# Patient Record
Sex: FEMALE | Race: WHITE | NOT HISPANIC OR LATINO | Employment: OTHER | URBAN - METROPOLITAN AREA
[De-identification: names, ages, dates, MRNs, and addresses within clinical notes are randomized per-mention and may not be internally consistent; named-entity substitution may affect disease eponyms.]

---

## 2017-02-23 RX ORDER — VALACYCLOVIR HYDROCHLORIDE 1 G/1
TABLET, FILM COATED ORAL
Qty: 30 TABLET | Refills: 3 | Status: SHIPPED | OUTPATIENT
Start: 2017-02-23

## 2017-05-12 ENCOUNTER — HOSPITAL ENCOUNTER (OUTPATIENT)
Dept: RADIOLOGY | Facility: HOSPITAL | Age: 52
Discharge: HOME | End: 2017-05-12
Attending: FAMILY MEDICINE
Payer: COMMERCIAL

## 2017-05-12 DIAGNOSIS — E34.9 UNSPECIFIED ENDOCRINE DISORDER: ICD-10-CM

## 2017-05-12 DIAGNOSIS — Z12.31 VISIT FOR SCREENING MAMMOGRAM: ICD-10-CM

## 2017-05-12 PROCEDURE — 77080 DXA BONE DENSITY AXIAL: CPT | Mod: TC,PO

## 2017-05-12 PROCEDURE — 77067 SCR MAMMO BI INCL CAD: CPT | Mod: TC

## 2017-05-12 PROCEDURE — 77067 SCR MAMMO BI INCL CAD: CPT | Mod: 26,,, | Performed by: RADIOLOGY

## 2017-05-12 PROCEDURE — 77063 BREAST TOMOSYNTHESIS BI: CPT | Mod: 26,,, | Performed by: RADIOLOGY

## 2017-05-12 PROCEDURE — 77080 DXA BONE DENSITY AXIAL: CPT | Mod: 26,,, | Performed by: RADIOLOGY

## 2017-05-19 ENCOUNTER — HOSPITAL ENCOUNTER (OUTPATIENT)
Dept: RADIOLOGY | Facility: HOSPITAL | Age: 52
Discharge: HOME | End: 2017-05-19
Attending: OBSTETRICS & GYNECOLOGY
Payer: COMMERCIAL

## 2017-05-19 DIAGNOSIS — R92.8 ABNORMAL MAMMOGRAM OF LEFT BREAST: ICD-10-CM

## 2017-05-19 PROCEDURE — 77061 BREAST TOMOSYNTHESIS UNI: CPT | Mod: TC,LT

## 2017-05-19 PROCEDURE — 77065 DX MAMMO INCL CAD UNI: CPT | Mod: 26,LT,, | Performed by: RADIOLOGY

## 2017-05-19 PROCEDURE — 77065 DX MAMMO INCL CAD UNI: CPT | Mod: TC,LT

## 2017-05-19 PROCEDURE — 77061 BREAST TOMOSYNTHESIS UNI: CPT | Mod: 26,LT,, | Performed by: RADIOLOGY

## 2017-05-19 PROCEDURE — 76642 ULTRASOUND BREAST LIMITED: CPT | Mod: TC,PO,LT

## 2017-05-19 PROCEDURE — 76642 ULTRASOUND BREAST LIMITED: CPT | Mod: 26,LT,, | Performed by: RADIOLOGY

## 2017-05-22 ENCOUNTER — OFFICE VISIT (OUTPATIENT)
Dept: OTOLARYNGOLOGY | Facility: CLINIC | Age: 52
End: 2017-05-22
Payer: COMMERCIAL

## 2017-05-22 ENCOUNTER — TELEPHONE (OUTPATIENT)
Dept: SURGERY | Facility: HOSPITAL | Age: 52
End: 2017-05-22

## 2017-05-22 ENCOUNTER — CLINICAL SUPPORT (OUTPATIENT)
Dept: AUDIOLOGY | Facility: CLINIC | Age: 52
End: 2017-05-22
Payer: COMMERCIAL

## 2017-05-22 ENCOUNTER — LAB VISIT (OUTPATIENT)
Dept: LAB | Facility: HOSPITAL | Age: 52
End: 2017-05-22
Attending: UROLOGY
Payer: COMMERCIAL

## 2017-05-22 VITALS
SYSTOLIC BLOOD PRESSURE: 141 MMHG | WEIGHT: 154.75 LBS | DIASTOLIC BLOOD PRESSURE: 71 MMHG | BODY MASS INDEX: 24.87 KG/M2 | HEART RATE: 62 BPM | HEIGHT: 66 IN

## 2017-05-22 DIAGNOSIS — J30.9 ALLERGIC RHINITIS, UNSPECIFIED ALLERGIC RHINITIS TRIGGER, UNSPECIFIED RHINITIS SEASONALITY: Primary | ICD-10-CM

## 2017-05-22 DIAGNOSIS — H92.01 EAR PAIN, RIGHT: Primary | ICD-10-CM

## 2017-05-22 DIAGNOSIS — L29.9 ITCHING OF EAR: ICD-10-CM

## 2017-05-22 DIAGNOSIS — R07.0 THROAT DISCOMFORT: ICD-10-CM

## 2017-05-22 DIAGNOSIS — N30.00 ACUTE CYSTITIS WITHOUT HEMATURIA: ICD-10-CM

## 2017-05-22 DIAGNOSIS — N30.00 ACUTE CYSTITIS WITHOUT HEMATURIA: Primary | ICD-10-CM

## 2017-05-22 DIAGNOSIS — H92.02 OTALGIA OF LEFT EAR: Primary | ICD-10-CM

## 2017-05-22 LAB
BACTERIA #/AREA URNS HPF: ABNORMAL /HPF
BILIRUB UR QL STRIP: NEGATIVE
CLARITY UR: CLEAR
COLOR UR: YELLOW
GLUCOSE UR QL STRIP: NEGATIVE
HGB UR QL STRIP: ABNORMAL
KETONES UR QL STRIP: NEGATIVE
LEUKOCYTE ESTERASE UR QL STRIP: ABNORMAL
MICROSCOPIC COMMENT: ABNORMAL
NITRITE UR QL STRIP: NEGATIVE
PH UR STRIP: 7 [PH] (ref 5–8)
PROT UR QL STRIP: NEGATIVE
RBC #/AREA URNS HPF: 5 /HPF (ref 0–4)
SP GR UR STRIP: 1.01 (ref 1–1.03)
SQUAMOUS #/AREA URNS HPF: 1 /HPF
URN SPEC COLLECT METH UR: ABNORMAL
WBC #/AREA URNS HPF: 12 /HPF (ref 0–5)

## 2017-05-22 PROCEDURE — 87088 URINE BACTERIA CULTURE: CPT

## 2017-05-22 PROCEDURE — 3077F SYST BP >= 140 MM HG: CPT | Mod: S$GLB,,, | Performed by: NURSE PRACTITIONER

## 2017-05-22 PROCEDURE — 81000 URINALYSIS NONAUTO W/SCOPE: CPT | Mod: PO

## 2017-05-22 PROCEDURE — 3078F DIAST BP <80 MM HG: CPT | Mod: S$GLB,,, | Performed by: NURSE PRACTITIONER

## 2017-05-22 PROCEDURE — 87077 CULTURE AEROBIC IDENTIFY: CPT

## 2017-05-22 PROCEDURE — 99999 PR PBB SHADOW E&M-EST. PATIENT-LVL I: CPT | Mod: PBBFAC,,,

## 2017-05-22 PROCEDURE — 92567 TYMPANOMETRY: CPT | Mod: S$GLB,,, | Performed by: AUDIOLOGIST

## 2017-05-22 PROCEDURE — 1160F RVW MEDS BY RX/DR IN RCRD: CPT | Mod: S$GLB,,, | Performed by: NURSE PRACTITIONER

## 2017-05-22 PROCEDURE — 87186 SC STD MICRODIL/AGAR DIL: CPT

## 2017-05-22 PROCEDURE — 87086 URINE CULTURE/COLONY COUNT: CPT

## 2017-05-22 PROCEDURE — 99999 PR PBB SHADOW E&M-EST. PATIENT-LVL IV: CPT | Mod: PBBFAC,,, | Performed by: NURSE PRACTITIONER

## 2017-05-22 PROCEDURE — 99213 OFFICE O/P EST LOW 20 MIN: CPT | Mod: S$GLB,,, | Performed by: NURSE PRACTITIONER

## 2017-05-22 RX ORDER — CIPROFLOXACIN 500 MG/1
500 TABLET ORAL 2 TIMES DAILY
Qty: 20 TABLET | Refills: 0 | Status: SHIPPED | OUTPATIENT
Start: 2017-05-22 | End: 2017-06-01

## 2017-05-22 RX ORDER — FLUTICASONE PROPIONATE 50 MCG
1 SPRAY, SUSPENSION (ML) NASAL 2 TIMES DAILY
Qty: 16 G | Refills: 12 | Status: SHIPPED | OUTPATIENT
Start: 2017-05-22 | End: 2017-05-25

## 2017-05-22 NOTE — PATIENT INSTRUCTIONS
A bacterial infection of the throat is usually associated with pus, marked redness, swollen lymph nodes, and fever > 100.4.  Please take you temperature if you believe you are running fever. Please be sure to see your PCP or [facility] for throat swab if you begin to develop those symptoms.     The large majority of acute sore throats are viral. There is nothing to do but treat the symptoms.     Left-sided throat and ear symptoms in a left-sided sleeper may also be reflux-related. So try to elevate your head of bed.

## 2017-05-22 NOTE — PROGRESS NOTES
Tympanometry completed per order from Dayana Levi.     Type A (normal) tympanograms obtained bilaterally.

## 2017-05-22 NOTE — PROGRESS NOTES
Subjective:       Patient ID: Linda He is a 52 y.o. female.    Chief Complaint: Ear Fullness and Sore Throat    Ear Fullness    Associated symptoms include a sore throat (left side irritated). Pertinent negatives include no coughing, rash or rhinorrhea.   Sore Throat    Associated symptoms include a plugged ear sensation. Pertinent negatives include no coughing, ear pain or shortness of breath.      Patient states symptoms started less than 24 hours ago. She reports mild left-sided throat discomfort and a deep itchy sensation in her left ear. She suspects low-grade fever. She denies signs and symptoms of acute bacterial rhinosinusitis.     Review of Systems   Constitutional: Fever: subjective low-grade.   HENT: Positive for sore throat (left side irritated). Negative for ear pain and rhinorrhea.         Left ear with deep itch   Eyes: Negative.    Respiratory: Negative.  Negative for cough, shortness of breath and wheezing.    Cardiovascular: Negative.    Gastrointestinal: Negative.    Musculoskeletal: Negative.  Negative for gait problem.   Skin: Negative.  Negative for color change, pallor and rash.   Neurological: Negative.    Hematological: Negative.  Negative for adenopathy.   Psychiatric/Behavioral: Negative.  Negative for agitation.       Objective:      Physical Exam   Constitutional: She is oriented to person, place, and time. Vital signs are normal. She appears well-developed and well-nourished. She is cooperative. She does not appear ill. No distress.   HENT:   Head: Normocephalic and atraumatic.   Right Ear: Hearing, tympanic membrane, external ear and ear canal normal. Tympanic membrane is not erythematous. No middle ear effusion.   Left Ear: Hearing, tympanic membrane, external ear and ear canal normal. Tympanic membrane is not erythematous.  No middle ear effusion.   Nose: Nose normal. No mucosal edema, rhinorrhea or septal deviation. Right sinus exhibits no maxillary sinus tenderness and no  frontal sinus tenderness. Left sinus exhibits no maxillary sinus tenderness and no frontal sinus tenderness.   Mouth/Throat: Uvula is midline, oropharynx is clear and moist and mucous membranes are normal. Mucous membranes are not pale, not dry and not cyanotic. No oral lesions. No oropharyngeal exudate, posterior oropharyngeal edema or posterior oropharyngeal erythema.   Eyes: Conjunctivae, EOM and lids are normal. Pupils are equal, round, and reactive to light. Right eye exhibits no discharge. Left eye exhibits no discharge. No scleral icterus.   Neck: Trachea normal and normal range of motion. Neck supple. No tracheal deviation present. No thyroid mass and no thyromegaly present.   Cardiovascular: Normal rate.    Pulmonary/Chest: Effort normal. No stridor. No respiratory distress. She has no wheezes.   Musculoskeletal: Normal range of motion.   Lymphadenopathy:        Head (right side): No submental, no submandibular, no tonsillar, no preauricular and no posterior auricular adenopathy present.        Head (left side): No submental, no submandibular, no tonsillar, no preauricular and no posterior auricular adenopathy present.     She has no cervical adenopathy.        Right cervical: No superficial cervical and no posterior cervical adenopathy present.       Left cervical: No superficial cervical and no posterior cervical adenopathy present.   Neurological: She is alert and oriented to person, place, and time. She has normal strength. Coordination and gait normal.   Skin: Skin is warm, dry and intact. No lesion and no rash noted. She is not diaphoretic. No cyanosis. No pallor.   Psychiatric: She has a normal mood and affect. Her speech is normal and behavior is normal. Judgment and thought content normal. Cognition and memory are normal.   Nursing note and vitals reviewed.      Assessment:     Negative ENT exam      Plan:    ENT exam was all clear without evidence of infection.   Baby oil to ear prn  Flonase   Her  PCP is Dr. Mora; she is encouraged to see him if symptoms persist.

## 2017-05-24 ENCOUNTER — TELEPHONE (OUTPATIENT)
Dept: UROLOGY | Facility: CLINIC | Age: 52
End: 2017-05-24

## 2017-05-24 LAB — BACTERIA UR CULT: NORMAL

## 2017-05-24 NOTE — TELEPHONE ENCOUNTER
----- Message from Rajni Anderson sent at 5/24/2017 11:27 AM CDT -----  Contact: self  Patient called stating she is on an antibiotic for an UTI    It is not helping   She is still burning with back pain    Please call her at  to advise is something can be called in      Thanks

## 2017-05-25 ENCOUNTER — OFFICE VISIT (OUTPATIENT)
Dept: UROLOGY | Facility: CLINIC | Age: 52
End: 2017-05-25
Payer: COMMERCIAL

## 2017-05-25 ENCOUNTER — TELEPHONE (OUTPATIENT)
Dept: UROLOGY | Facility: CLINIC | Age: 52
End: 2017-05-25

## 2017-05-25 VITALS
BODY MASS INDEX: 24.63 KG/M2 | DIASTOLIC BLOOD PRESSURE: 68 MMHG | SYSTOLIC BLOOD PRESSURE: 118 MMHG | WEIGHT: 153.25 LBS | HEIGHT: 66 IN | HEART RATE: 59 BPM

## 2017-05-25 DIAGNOSIS — N35.12 POSTINFECTIVE URETHRAL STRICTURE IN FEMALE: ICD-10-CM

## 2017-05-25 DIAGNOSIS — N30.00 ACUTE CYSTITIS WITHOUT HEMATURIA: Primary | ICD-10-CM

## 2017-05-25 PROCEDURE — 53660 DILATION OF URETHRA: CPT | Mod: S$GLB,,, | Performed by: UROLOGY

## 2017-05-25 PROCEDURE — 99499 UNLISTED E&M SERVICE: CPT | Mod: S$GLB,,, | Performed by: UROLOGY

## 2017-05-25 PROCEDURE — 99999 PR PBB SHADOW E&M-EST. PATIENT-LVL III: CPT | Mod: PBBFAC,,, | Performed by: UROLOGY

## 2017-05-25 RX ORDER — OLMESARTAN MEDOXOMIL 20 MG/1
20 TABLET ORAL DAILY
COMMUNITY
End: 2017-11-07

## 2017-05-25 NOTE — PROGRESS NOTES
UROLOGY [place]  5 25 17    c-c nocturia, back pain    Age 52, comes in with some low back pain bilaterally. It is worse with certain body movements.  She had a uti lately with pansensitive e coli and was put on cipro for 10 days. She is feeling better.  She says in the past her urethral has tended to close up and dilatation of it has helped keep uti's away.    No fever now. Has nocturia x 2-3, some urinary frequency, no urgency or incontinence.   Pt requests urethral dilatation    FEMALE URETHRAL DILATATION PROCEDURE  Prop dx: stenosis of urethral  Postop dianosis: same   Procedure Female urethral dilatation  The genital area was prepped   We lubricated the urethra and the darrel sounds   We started with the 18F sound and continued with progressive sizes in even numbers.  There was some resistance as the dilators were pushed it, minimal discomfort.   The last size used was 28F  Pt tolerated well  There was no obvious bleeding.    Pt was then allowed to go she was in satisfactory condition.  Finish the cipro antibiotic as instructed  Pain medication as needed.  Her pain is likely musculoskeletal  RTC as needed.

## 2017-05-25 NOTE — TELEPHONE ENCOUNTER
----- Message from Mickey Martínez MD sent at 5/25/2017 12:49 PM CDT -----  Positive urine culture, sensitive to Cipro

## 2017-05-25 NOTE — TELEPHONE ENCOUNTER
SPoke to pt and booked her an apt for today. Pt does have uti, but is not feeling any better and is experiencing flank pain.

## 2017-05-25 NOTE — TELEPHONE ENCOUNTER
----- Message from Kiko Perez sent at 5/25/2017  8:21 AM CDT -----  Pt called stated that she's waiting on a call back from the nurse/stated that she's on her 4th antiobiotic and its not helping the bladder infection/pls call back at [phone]

## 2017-08-23 ENCOUNTER — TELEPHONE (OUTPATIENT)
Dept: UROLOGY | Facility: CLINIC | Age: 52
End: 2017-08-23

## 2017-08-23 ENCOUNTER — LAB VISIT (OUTPATIENT)
Dept: LAB | Facility: HOSPITAL | Age: 52
End: 2017-08-23
Attending: UROLOGY
Payer: COMMERCIAL

## 2017-08-23 DIAGNOSIS — N30.00 ACUTE CYSTITIS WITHOUT HEMATURIA: Primary | ICD-10-CM

## 2017-08-23 DIAGNOSIS — N30.00 ACUTE CYSTITIS WITHOUT HEMATURIA: ICD-10-CM

## 2017-08-23 LAB
BACTERIA #/AREA URNS HPF: ABNORMAL /HPF
BILIRUB UR QL STRIP: NEGATIVE
CLARITY UR: CLEAR
COLOR UR: YELLOW
GLUCOSE UR QL STRIP: NEGATIVE
HGB UR QL STRIP: ABNORMAL
KETONES UR QL STRIP: NEGATIVE
LEUKOCYTE ESTERASE UR QL STRIP: NEGATIVE
MICROSCOPIC COMMENT: ABNORMAL
NITRITE UR QL STRIP: NEGATIVE
PH UR STRIP: 7 [PH] (ref 5–8)
PROT UR QL STRIP: NEGATIVE
RBC #/AREA URNS HPF: 8 /HPF (ref 0–4)
SP GR UR STRIP: 1.01 (ref 1–1.03)
SQUAMOUS #/AREA URNS HPF: 4 /HPF
URN SPEC COLLECT METH UR: ABNORMAL
WBC #/AREA URNS HPF: 3 /HPF (ref 0–5)

## 2017-08-23 PROCEDURE — 81000 URINALYSIS NONAUTO W/SCOPE: CPT | Mod: PO

## 2017-08-23 PROCEDURE — 87086 URINE CULTURE/COLONY COUNT: CPT

## 2017-08-23 NOTE — TELEPHONE ENCOUNTER
----- Message from Cecilia Martinez sent at 8/23/2017  8:16 AM CDT -----  Contact: self   Patient want to know if you can send orders for urine test, please call patient back after you put orders in [phone] (home)

## 2017-08-24 LAB
BACTERIA UR CULT: NORMAL
BACTERIA UR CULT: NORMAL

## 2017-08-25 ENCOUNTER — TELEPHONE (OUTPATIENT)
Dept: UROLOGY | Facility: CLINIC | Age: 52
End: 2017-08-25

## 2017-08-25 NOTE — TELEPHONE ENCOUNTER
----- Message from Jovan Escobar sent at 8/25/2017  1:10 PM CDT -----  Contact: same  Patient called in and was checking the status of her urine test that was done on 8/23.  Patient call back number is [phone]

## 2017-11-07 ENCOUNTER — OFFICE VISIT (OUTPATIENT)
Dept: OBSTETRICS AND GYNECOLOGY | Facility: CLINIC | Age: 52
End: 2017-11-07
Payer: COMMERCIAL

## 2017-11-07 VITALS — HEIGHT: 67 IN | WEIGHT: 153.69 LBS | BODY MASS INDEX: 24.12 KG/M2

## 2017-11-07 DIAGNOSIS — N95.2 ATROPHIC VAGINITIS: Primary | ICD-10-CM

## 2017-11-07 PROCEDURE — 99203 OFFICE O/P NEW LOW 30 MIN: CPT | Mod: S$GLB,,, | Performed by: SPECIALIST

## 2017-11-07 PROCEDURE — 99999 PR PBB SHADOW E&M-EST. PATIENT-LVL III: CPT | Mod: PBBFAC,,, | Performed by: SPECIALIST

## 2017-11-07 RX ORDER — ESTRADIOL 0.1 MG/G
1 CREAM VAGINAL DAILY
Qty: 30 G | Refills: 11 | Status: SHIPPED | OUTPATIENT
Start: 2017-11-07 | End: 2019-05-28 | Stop reason: SDUPTHER

## 2017-11-07 RX ORDER — SPIRONOLACTONE 50 MG/1
50 TABLET, FILM COATED ORAL EVERY MORNING
COMMUNITY

## 2017-11-07 NOTE — PROGRESS NOTES
51 yo WF, h/o hyst and on ERT for approx 3 years presents for evaluation complaint vaginal vulvar irriation and dryness which was exacerbated over last several weeks. Pt denies visible lesion, discharge, vaginal bleeding, n/v. Is followed by Urology for chronic cystitis.  Past Medical History:   Diagnosis Date    Abnormal Pap smear     Abnormal Pap smear of cervix     Allergy     Hormone disorder     Hypertension     Kidney stone     all passed spontaneously (approx x 2)    Urinary tract infection     Vaginal infection        Past Surgical History:   Procedure Laterality Date    APPENDECTOMY       surgery       SECTION      CYSTOSCOPY      HYSTERECTOMY      OOPHORECTOMY         Family History   Problem Relation Age of Onset    Hypertension Mother     Hypertension Father     Nephrolithiasis Brother     Breast cancer Paternal Grandmother       of    Diabetes Neg Hx     Ovarian cancer Neg Hx        Social History     Social History    Marital status:      Spouse name: N/A    Number of children: N/A    Years of education: N/A     Occupational History    draws house plans, 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      magnesium citrate 100 mg Tab Take by mouth once daily.      spironolactone (ALDACTONE) 50 MG tablet Take 50 mg by mouth once daily.      vitamin D 1000 units Tab Take 185 mg by mouth once daily.      estradiol (ESTRACE) 0.01 % (0.1 mg/gram) vaginal cream Place 1 g vaginally once daily. 30 g 11    valacyclovir (VALTREX) 1000 MG tablet TAKE 2 PILLS AT THE FIRST SIGN OF  A COLD SORE, TAKE AN ADDITIONAL 2 PILLS 12 HOURS LATER. 30 tablet 3     No current facility-administered medications for this visit.        Review of patient's allergies indicates:   Allergen Reactions    Latex Hives       PE:   APPEARANCE: Well nourished, well developed, in no acute distress.  NECK: Neck symmetric without masses or thyromegaly.  NODES: No inguinal lymph node enlargement.  ABDOMEN: Soft. No tenderness or masses. No hepatosplenomegaly. No hernias.  BREASTS: Symmetrical, no skin changes or visible lesions. No palpable masses, nipple discharge or adenopathy bilaterally.  PELVIC: Normal external female genitalia without lesions. Normal hair distribution. Adequate perineal body, normal urethral meatus. Vagina atrophic and poorly rugated without lesions or discharge. No significant cystocele or rectocele. Uterus and cervix surgically absent. Bimanual exam revealed no masses, tenderness or abnormality.    I discussed ATV and recommend daily application estrogen cream. I explained estrogen dependent changes and over time feel will improve.  Answered all questions and pt is agreeable  mammo screening April 2018  COUNSELING:  The patient was counseled today on osteoporosis prevention, calcium supplementation, and regular weight bearing exercise. The patient was also counseled today on ACS PAP guidelines, with recommendations for yearly pelvic exams unless their uterus, cervix, and ovaries were removed for benign reasons; in that case, examinations every 3-5 years are recommended. The patient was also counseled regarding monthly breast self-examination, routine STD screening for at-risk populations, prophylactic immunizations for transmitted infections such as HPV, Pertussis, or Influenza as appropriate, and yearly mammograms when indicated by ACS guidelines. She was advised to see her primary care physician for all other health maintenance.   FOLLOW-UP with me for next routine visit.

## 2017-12-06 ENCOUNTER — TELEPHONE (OUTPATIENT)
Dept: UROLOGY | Facility: CLINIC | Age: 52
End: 2017-12-06

## 2017-12-06 NOTE — TELEPHONE ENCOUNTER
Office visit scheduled for tomorrow, 12/7/17. Flank pain, foul smelling urine, been on Levaquin for 6 days.

## 2017-12-06 NOTE — TELEPHONE ENCOUNTER
----- Message from Rosio Clifton sent at 12/6/2017  8:14 AM CST -----  Contact: socorro Palacios,  Day 6, still no relief   Call back

## 2017-12-07 ENCOUNTER — OFFICE VISIT (OUTPATIENT)
Dept: UROLOGY | Facility: CLINIC | Age: 52
End: 2017-12-07
Payer: COMMERCIAL

## 2017-12-07 VITALS
HEIGHT: 67 IN | SYSTOLIC BLOOD PRESSURE: 134 MMHG | DIASTOLIC BLOOD PRESSURE: 78 MMHG | TEMPERATURE: 98 F | HEART RATE: 70 BPM | WEIGHT: 153.44 LBS | BODY MASS INDEX: 24.08 KG/M2

## 2017-12-07 DIAGNOSIS — R31.29 HEMATURIA, MICROSCOPIC: ICD-10-CM

## 2017-12-07 DIAGNOSIS — M54.41 ACUTE BILATERAL LOW BACK PAIN WITH RIGHT-SIDED SCIATICA: Primary | ICD-10-CM

## 2017-12-07 LAB
BILIRUB SERPL-MCNC: ABNORMAL MG/DL
BLOOD URINE, POC: ABNORMAL
COLOR, POC UA: YELLOW
GLUCOSE UR QL STRIP: ABNORMAL
KETONES UR QL STRIP: ABNORMAL
LEUKOCYTE ESTERASE URINE, POC: ABNORMAL
NITRITE, POC UA: ABNORMAL
PH, POC UA: 8
PROTEIN, POC: ABNORMAL
SPECIFIC GRAVITY, POC UA: 1.01
UROBILINOGEN, POC UA: ABNORMAL

## 2017-12-07 PROCEDURE — 99999 PR PBB SHADOW E&M-EST. PATIENT-LVL III: CPT | Mod: PBBFAC,,, | Performed by: UROLOGY

## 2017-12-07 PROCEDURE — 99214 OFFICE O/P EST MOD 30 MIN: CPT | Mod: 25,S$GLB,, | Performed by: UROLOGY

## 2017-12-07 PROCEDURE — 81002 URINALYSIS NONAUTO W/O SCOPE: CPT | Mod: S$GLB,,, | Performed by: UROLOGY

## 2017-12-07 RX ORDER — LEVOFLOXACIN 750 MG/1
750 TABLET ORAL DAILY
COMMUNITY
End: 2019-04-10

## 2017-12-07 NOTE — PROGRESS NOTES
Subjective:       Patient ID: Linda He is a 52 y.o. female.    Chief Complaint: Flank Pain    HPI     52 year old with chronic cystitis.  She was travelling in [place] and noticed bad urine odor.  She had no dysuria but did note worsening urgency.  She had a course of Levaquin on hand which she began 7 days ago.  He urinary symptoms have resolved but she still complains of low back pain and headache.  Pain is across lower back.  The pain is positional.  She denies gross hematuria.  Her UA notes microhematuria which she says has been an ongoing finding since childhood.  Previously evaluated by Dr. Martínez.    Urine dipstick shows negative for nitrites, leukocytes, glucose, protein, positive for 2+blood.  Micro:  3-5 RBP/hpf    Review of Systems   Constitutional: Negative for fever.   Genitourinary: Negative for dysuria and hematuria.       Objective:      Physical Exam   Constitutional: She is oriented to person, place, and time. She appears well-developed and well-nourished.   HENT:   Head: Normocephalic.   Eyes: Conjunctivae and EOM are normal.   Neck: Normal range of motion.   Cardiovascular: Normal rate.    Pulmonary/Chest: Effort normal.   Abdominal: Soft. She exhibits no distension and no mass. There is no CVA tenderness.   Genitourinary:   Genitourinary Comments: Bladder non-tender and nondistended  No CVA tenderness   Musculoskeletal: She exhibits no edema.   Neurological: She is alert and oriented to person, place, and time.   Skin: Skin is warm and dry. No rash noted. No erythema.   Psychiatric: She has a normal mood and affect. Her behavior is normal.   Vitals reviewed.      Assessment:       1. Acute bilateral low back pain    2. Hematuria, microscopic        Plan:       Acute bilateral low back pain  -     POCT URINE DIPSTICK WITHOUT MICROSCOPE    Hematuria, microscopic      Her pain is likely musculoskeletal given her recent travels.   No evidence of infection on UA.  Rec.  Rest and NSAIDs.   Complete Levaquin.  If symptoms persist, f/u Dr Martínez in a couple of weeks.

## 2018-04-06 ENCOUNTER — OFFICE VISIT (OUTPATIENT)
Dept: OBSTETRICS AND GYNECOLOGY | Facility: CLINIC | Age: 53
End: 2018-04-06
Payer: COMMERCIAL

## 2018-04-06 VITALS
BODY MASS INDEX: 24.45 KG/M2 | DIASTOLIC BLOOD PRESSURE: 62 MMHG | SYSTOLIC BLOOD PRESSURE: 124 MMHG | WEIGHT: 152.13 LBS | HEIGHT: 66 IN

## 2018-04-06 DIAGNOSIS — Z01.419 ENCOUNTER FOR GYNECOLOGICAL EXAMINATION WITHOUT ABNORMAL FINDING: Primary | ICD-10-CM

## 2018-04-06 DIAGNOSIS — Z01.419 GYNECOLOGIC EXAM NORMAL: ICD-10-CM

## 2018-04-06 PROCEDURE — 87624 HPV HI-RISK TYP POOLED RSLT: CPT

## 2018-04-06 PROCEDURE — 88175 CYTOPATH C/V AUTO FLUID REDO: CPT

## 2018-04-06 PROCEDURE — 99396 PREV VISIT EST AGE 40-64: CPT | Mod: S$GLB,,, | Performed by: SPECIALIST

## 2018-04-06 PROCEDURE — 99999 PR PBB SHADOW E&M-EST. PATIENT-LVL IV: CPT | Mod: PBBFAC,,, | Performed by: SPECIALIST

## 2018-04-06 NOTE — PROGRESS NOTES
52 yo WF presents for annual gyn evaluation. Mammo screening up to date and reveiwd.  Past Medical History:   Diagnosis Date    Abnormal Pap smear     Abnormal Pap smear of cervix     Allergy     Hormone disorder     Hypertension     Kidney stone     all passed spontaneously (approx x 2)    Urinary tract infection     Vaginal infection        Past Surgical History:   Procedure Laterality Date    APPENDECTOMY       surgery       SECTION      CYSTOSCOPY      HYSTERECTOMY      OOPHORECTOMY         Family History   Problem Relation Age of Onset    Hypertension Mother     Hypertension Father     Nephrolithiasis Brother     Breast cancer Paternal Grandmother       of    Diabetes Neg Hx     Ovarian cancer Neg Hx        Social History     Social History    Marital status:      Spouse name: N/A    Number of children: N/A    Years of education: N/A     Occupational History    draws house plans, 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      magnesium citrate 100 mg Tab Take by mouth once daily.      spironolactone (ALDACTONE) 50 MG tablet Take 50 mg by mouth once daily.      vitamin D 1000 units Tab Take 185 mg by mouth once daily.      estradiol (ESTRACE) 0.01 % (0.1 mg/gram) vaginal cream Place 1 g vaginally once daily. 30 g 11    levoFLOXacin (LEVAQUIN) 750 MG tablet Take 750 mg by mouth once daily.      valacyclovir (VALTREX) 1000 MG tablet TAKE 2 PILLS AT THE FIRST SIGN OF A COLD SORE, TAKE AN ADDITIONAL 2 PILLS 12 HOURS LATER. 30 tablet 3     No current facility-administered  medications for this visit.        Review of patient's allergies indicates:   Allergen Reactions    Latex Hives    Macrobid [nitrofurantoin monohyd/m-cryst] Itching       Review of System:   General: no chills, fever, night sweats, weight gain or weight loss  Psychological: no depression or suicidal ideation  Breasts: no new or changing breast lumps, nipple discharge or masses.  Respiratory: no cough, shortness of breath, or wheezing  Cardiovascular: no chest pain or dyspnea on exertion  Gastrointestinal: no abdominal pain, change in bowel habits, or black or bloody stools  Genito-Urinary: no incontinence, urinary frequency/urgency or vulvar/vaginal symptoms, pelvic pain or abnormal vaginal bleeding.  Musculoskeletal: no gait disturbance or muscular weakness    PE:   APPEARANCE: Well nourished, well developed, in no acute distress.  NECK: Neck symmetric without masses or thyromegaly.  NODES: No inguinal lymph node enlargement.  ABDOMEN: Soft. No tenderness or masses. No hepatosplenomegaly. No hernias.  BREASTS: Symmetrical, no skin changes or visible lesions. No palpable masses, nipple discharge or adenopathy bilaterally.  PELVIC: Normal external female genitalia without lesions. Normal hair distribution. Adequate perineal body, normal urethral meatus. Vagina moist and well rugated without lesions or discharge. No significant cystocele or rectocele. Uterus and cervix surgically absent. Bimanual exam revealed no masses, tenderness or abnormality.      PAP submitted    COUNSELING:  The patient was counseled today on osteoporosis prevention, calcium supplementation, and regular weight bearing exercise. The patient was also counseled today on ACS PAP guidelines, with recommendations for yearly pelvic exams unless their uterus, cervix, and ovaries were removed for benign reasons; in that case, examinations every 3-5 years are recommended. The patient was also counseled regarding monthly breast self-examination, routine  STD screening for at-risk populations, prophylactic immunizations for transmitted infections such as HPV, Pertussis, or Influenza as appropriate, and yearly mammograms when indicated by ACS guidelines. She was advised to see her primary care physician for all other health maintenance.   FOLLOW-UP with me for next routine visit.

## 2018-04-13 LAB
HPV16 AG SPEC QL: NEGATIVE
HPV16+18+H RISK 12 DNA CVX-IMP: NEGATIVE
HPV18 DNA SPEC QL NAA+PROBE: NEGATIVE

## 2018-05-09 ENCOUNTER — TELEPHONE (OUTPATIENT)
Dept: OBSTETRICS AND GYNECOLOGY | Facility: CLINIC | Age: 53
End: 2018-05-09

## 2018-05-09 DIAGNOSIS — Z12.31 VISIT FOR SCREENING MAMMOGRAM: Primary | ICD-10-CM

## 2018-05-09 NOTE — TELEPHONE ENCOUNTER
----- Message from Jovan Escobar sent at 5/9/2018  2:36 PM CDT -----  Contact: same  Patient called in and stated she received notification that it was time for her annual mammo, last one done on 5/19/17 at [facility].  Patient stated it would easier for patient to do it at Dr. Harvey's office.  Patient call back number is [phone]

## 2018-05-25 ENCOUNTER — HOSPITAL ENCOUNTER (OUTPATIENT)
Dept: RADIOLOGY | Facility: HOSPITAL | Age: 53
Discharge: HOME | End: 2018-05-25
Attending: SPECIALIST
Payer: COMMERCIAL

## 2018-05-25 VITALS — WEIGHT: 152 LBS | HEIGHT: 66 IN | BODY MASS INDEX: 24.43 KG/M2

## 2018-05-25 DIAGNOSIS — Z12.31 VISIT FOR SCREENING MAMMOGRAM: ICD-10-CM

## 2018-05-25 PROCEDURE — 77067 SCR MAMMO BI INCL CAD: CPT | Mod: TC,PN

## 2018-05-25 PROCEDURE — 77063 BREAST TOMOSYNTHESIS BI: CPT | Mod: 26,,, | Performed by: RADIOLOGY

## 2018-05-25 PROCEDURE — 77067 SCR MAMMO BI INCL CAD: CPT | Mod: 26,,, | Performed by: RADIOLOGY

## 2019-03-08 ENCOUNTER — CLINICAL SUPPORT (OUTPATIENT)
Dept: CARDIOLOGY | Facility: CLINIC | Age: 54
End: 2019-03-08
Attending: INTERNAL MEDICINE
Payer: COMMERCIAL

## 2019-03-08 ENCOUNTER — OFFICE VISIT (OUTPATIENT)
Dept: CARDIOLOGY | Facility: CLINIC | Age: 54
End: 2019-03-08
Payer: COMMERCIAL

## 2019-03-08 VITALS
WEIGHT: 160.25 LBS | DIASTOLIC BLOOD PRESSURE: 84 MMHG | HEIGHT: 66 IN | BODY MASS INDEX: 25.75 KG/M2 | SYSTOLIC BLOOD PRESSURE: 163 MMHG | HEART RATE: 74 BPM

## 2019-03-08 VITALS
BODY MASS INDEX: 25.71 KG/M2 | WEIGHT: 160 LBS | DIASTOLIC BLOOD PRESSURE: 84 MMHG | HEART RATE: 65 BPM | SYSTOLIC BLOOD PRESSURE: 163 MMHG | HEIGHT: 66 IN

## 2019-03-08 DIAGNOSIS — I10 ESSENTIAL HYPERTENSION: ICD-10-CM

## 2019-03-08 DIAGNOSIS — Z01.810 PREOP CARDIOVASCULAR EXAM: ICD-10-CM

## 2019-03-08 PROCEDURE — 99204 OFFICE O/P NEW MOD 45 MIN: CPT | Mod: 25,S$GLB,, | Performed by: INTERNAL MEDICINE

## 2019-03-08 PROCEDURE — 93000 ELECTROCARDIOGRAM COMPLETE: CPT | Mod: S$GLB,,, | Performed by: INTERNAL MEDICINE

## 2019-03-08 PROCEDURE — 3079F PR MOST RECENT DIASTOLIC BLOOD PRESSURE 80-89 MM HG: ICD-10-PCS | Mod: CPTII,S$GLB,, | Performed by: INTERNAL MEDICINE

## 2019-03-08 PROCEDURE — 93306 TTE W/DOPPLER COMPLETE: CPT | Mod: S$GLB,,, | Performed by: INTERNAL MEDICINE

## 2019-03-08 PROCEDURE — 99204 PR OFFICE/OUTPT VISIT, NEW, LEVL IV, 45-59 MIN: ICD-10-PCS | Mod: 25,S$GLB,, | Performed by: INTERNAL MEDICINE

## 2019-03-08 PROCEDURE — 3079F DIAST BP 80-89 MM HG: CPT | Mod: CPTII,S$GLB,, | Performed by: INTERNAL MEDICINE

## 2019-03-08 PROCEDURE — 93000 EKG 12-LEAD: ICD-10-PCS | Mod: S$GLB,,, | Performed by: INTERNAL MEDICINE

## 2019-03-08 PROCEDURE — 99999 PR PBB SHADOW E&M-EST. PATIENT-LVL II: ICD-10-PCS | Mod: PBBFAC,,,

## 2019-03-08 PROCEDURE — 3008F PR BODY MASS INDEX (BMI) DOCUMENTED: ICD-10-PCS | Mod: CPTII,S$GLB,, | Performed by: INTERNAL MEDICINE

## 2019-03-08 PROCEDURE — 99999 PR PBB SHADOW E&M-EST. PATIENT-LVL III: ICD-10-PCS | Mod: PBBFAC,,, | Performed by: INTERNAL MEDICINE

## 2019-03-08 PROCEDURE — 3008F BODY MASS INDEX DOCD: CPT | Mod: CPTII,S$GLB,, | Performed by: INTERNAL MEDICINE

## 2019-03-08 PROCEDURE — 99999 PR PBB SHADOW E&M-EST. PATIENT-LVL II: CPT | Mod: PBBFAC,,,

## 2019-03-08 PROCEDURE — 99999 PR PBB SHADOW E&M-EST. PATIENT-LVL III: CPT | Mod: PBBFAC,,, | Performed by: INTERNAL MEDICINE

## 2019-03-08 PROCEDURE — 3077F SYST BP >= 140 MM HG: CPT | Mod: CPTII,S$GLB,, | Performed by: INTERNAL MEDICINE

## 2019-03-08 PROCEDURE — 93306 TRANSTHORACIC ECHO (TTE) COMPLETE (CUPID ONLY): ICD-10-PCS | Mod: S$GLB,,, | Performed by: INTERNAL MEDICINE

## 2019-03-08 PROCEDURE — 3077F PR MOST RECENT SYSTOLIC BLOOD PRESSURE >= 140 MM HG: ICD-10-PCS | Mod: CPTII,S$GLB,, | Performed by: INTERNAL MEDICINE

## 2019-03-08 RX ORDER — AMLODIPINE BESYLATE 5 MG/1
5 TABLET ORAL DAILY
COMMUNITY
End: 2019-03-08 | Stop reason: DRUGHIGH

## 2019-03-08 RX ORDER — AMLODIPINE BESYLATE 10 MG/1
10 TABLET ORAL DAILY
Qty: 30 TABLET | Refills: 11 | Status: SHIPPED | OUTPATIENT
Start: 2019-03-08 | End: 2023-07-27 | Stop reason: ALTCHOICE

## 2019-03-08 NOTE — PROGRESS NOTES
Subjective:    Patient ID:  Linda He is a 54 y.o. female who presents for evaluation of Hypertension (Cardiac Clearance Right Knee Scope)      Pt scheduled next week for knee arthroscopy. She had pre-op ECG which computer read as abnormal. Other than HTN she has no other medical issues. She reports no symptoms and prior to her knee problem, she exercised regularly w/o problem. She denies any chest pain, SOB, PND, orthopnea. She denies any palpitations, dizziness, syncope or pre-syncope. She denies claudication, lower extremity edema. She denies exertional symptoms.        Review of Systems   Constitution: Negative for weight gain and weight loss.   HENT: Negative.    Eyes: Negative.    Cardiovascular: Negative for chest pain, claudication, cyanosis, dyspnea on exertion, irregular heartbeat, leg swelling, near-syncope, orthopnea (no PND), palpitations and syncope.   Respiratory: Negative for cough, hemoptysis, shortness of breath and snoring.    Endocrine: Negative.    Skin: Negative.    Musculoskeletal: Positive for joint pain. Negative for muscle cramps, muscle weakness and myalgias.   Gastrointestinal: Negative for diarrhea, hematemesis, nausea and vomiting.   Genitourinary: Negative.    Neurological: Negative for dizziness, focal weakness, light-headedness, loss of balance, numbness, paresthesias and seizures.   Psychiatric/Behavioral: Negative.         Objective:    Physical Exam   Constitutional: She is oriented to person, place, and time. She appears well-developed and well-nourished.   Eyes: Pupils are equal, round, and reactive to light.   Neck: Normal range of motion. No thyromegaly present.   Cardiovascular: Normal rate, regular rhythm, S1 normal, S2 normal, normal heart sounds, intact distal pulses and normal pulses.  No extrasystoles are present. PMI is not displaced. Exam reveals no friction rub.   No murmur heard.  Pulmonary/Chest: Effort normal and breath sounds normal. She has no  wheezes. She has no rales. She exhibits no tenderness.   Abdominal: Soft. Bowel sounds are normal. She exhibits no distension and no mass. There is no tenderness.   Musculoskeletal: Normal range of motion. She exhibits no edema.   Neurological: She is alert and oriented to person, place, and time.   Skin: Skin is warm and dry.   Vitals reviewed.      Test(s) Reviewed  I have reviewed the following in detail:  [] Stress test   [] Angiography   [] Echocardiogram   [x] Labs   [x] Other:  ECG - NSR; low anterior R voltage, otherwise normal ECG       Assessment:       1. Essential hypertension    2. Preop cardiovascular exam         Plan:       We discussed her BP   We discussed diet and reducing sodium in diet  Will increase amlodipine to 10 mg daily  Will check baseline Echo - does not need to be done before her arthroscopic knee procedure next week  She will monitor BP and f/u here in 1 month  She is asymptomatic with unremarkable ECG  No contraindication to planned surgery. Continue all meds eleazar-op.

## 2019-03-11 LAB
ASCENDING AORTA: 2.49 CM
AV INDEX (PROSTH): 0.87
AV MEAN GRADIENT: 6.11 MMHG
AV PEAK GRADIENT: 10.24 MMHG
AV VALVE AREA: 2.92 CM2
AV VELOCITY RATIO: 0.84
BSA FOR ECHO PROCEDURE: 1.84 M2
CV ECHO LV RWT: 0.49 CM
DOP CALC AO PEAK VEL: 1.6 M/S
DOP CALC AO VTI: 37.61 CM
DOP CALC LVOT AREA: 3.36 CM2
DOP CALC LVOT DIAMETER: 2.07 CM
DOP CALC LVOT PEAK VEL: 1.35 M/S
DOP CALC LVOT STROKE VOLUME: 109.79 CM3
DOP CALCLVOT PEAK VEL VTI: 32.64 CM
E WAVE DECELERATION TIME: 216.95 MSEC
E/A RATIO: 1.3
E/E' RATIO: 12.8
ECHO LV POSTERIOR WALL: 1.02 CM (ref 0.6–1.1)
FRACTIONAL SHORTENING: 59 % (ref 28–44)
INTERVENTRICULAR SEPTUM: 1.1 CM (ref 0.6–1.1)
IVRT: 0.11 MSEC
LA MAJOR: 4.86 CM
LA MINOR: 4.4 CM
LA WIDTH: 3.25 CM
LEFT ATRIUM SIZE: 3.64 CM
LEFT ATRIUM VOLUME INDEX: 25.5 ML/M2
LEFT ATRIUM VOLUME: 46.44 CM3
LEFT INTERNAL DIMENSION IN SYSTOLE: 1.72 CM (ref 2.1–4)
LEFT VENTRICLE DIASTOLIC VOLUME INDEX: 42.79 ML/M2
LEFT VENTRICLE DIASTOLIC VOLUME: 77.85 ML
LEFT VENTRICLE MASS INDEX: 81.3 G/M2
LEFT VENTRICLE SYSTOLIC VOLUME INDEX: 4.8 ML/M2
LEFT VENTRICLE SYSTOLIC VOLUME: 8.71 ML
LEFT VENTRICULAR INTERNAL DIMENSION IN DIASTOLE: 4.18 CM (ref 3.5–6)
LEFT VENTRICULAR MASS: 147.87 G
LV LATERAL E/E' RATIO: 12
LV SEPTAL E/E' RATIO: 13.71
MV PEAK A VEL: 0.74 M/S
MV PEAK E VEL: 0.96 M/S
PISA TR MAX VEL: 2.38 M/S
PULM VEIN S/D RATIO: 1.3
PV PEAK D VEL: 0.5 M/S
PV PEAK S VEL: 0.65 M/S
RA MAJOR: 4.69 CM
RA PRESSURE: 3 MMHG
RA WIDTH: 2.91 CM
RIGHT VENTRICULAR END-DIASTOLIC DIMENSION: 3.58 CM
SINUS: 2.75 CM
STJ: 2.91 CM
TDI LATERAL: 0.08
TDI SEPTAL: 0.07
TDI: 0.08
TR MAX PG: 22.66 MMHG
TRICUSPID ANNULAR PLANE SYSTOLIC EXCURSION: 3.1 CM
TV REST PULMONARY ARTERY PRESSURE: 26 MMHG

## 2019-03-13 ENCOUNTER — TELEPHONE (OUTPATIENT)
Dept: CARDIOLOGY | Facility: CLINIC | Age: 54
End: 2019-03-13

## 2019-03-13 ENCOUNTER — PATIENT MESSAGE (OUTPATIENT)
Dept: ADMINISTRATIVE | Facility: OTHER | Age: 54
End: 2019-03-13

## 2019-03-13 NOTE — TELEPHONE ENCOUNTER
Test(s) Reviewed  I have reviewed the following in detail:  [] Stress test   [] Angiography   [x] Echocardiogram   [] Labs   [] Other:       Call Pt and tell her Echo is normal

## 2019-04-10 ENCOUNTER — OFFICE VISIT (OUTPATIENT)
Dept: CARDIOLOGY | Facility: CLINIC | Age: 54
End: 2019-04-10
Payer: COMMERCIAL

## 2019-04-10 VITALS
BODY MASS INDEX: 25.19 KG/M2 | HEART RATE: 72 BPM | SYSTOLIC BLOOD PRESSURE: 136 MMHG | HEIGHT: 66 IN | DIASTOLIC BLOOD PRESSURE: 76 MMHG | WEIGHT: 156.75 LBS

## 2019-04-10 DIAGNOSIS — I10 ESSENTIAL HYPERTENSION: Primary | ICD-10-CM

## 2019-04-10 PROCEDURE — 3075F PR MOST RECENT SYSTOLIC BLOOD PRESS GE 130-139MM HG: ICD-10-PCS | Mod: CPTII,S$GLB,, | Performed by: INTERNAL MEDICINE

## 2019-04-10 PROCEDURE — 99213 PR OFFICE/OUTPT VISIT, EST, LEVL III, 20-29 MIN: ICD-10-PCS | Mod: S$GLB,,, | Performed by: INTERNAL MEDICINE

## 2019-04-10 PROCEDURE — 99999 PR PBB SHADOW E&M-EST. PATIENT-LVL III: CPT | Mod: PBBFAC,,, | Performed by: INTERNAL MEDICINE

## 2019-04-10 PROCEDURE — 99213 OFFICE O/P EST LOW 20 MIN: CPT | Mod: S$GLB,,, | Performed by: INTERNAL MEDICINE

## 2019-04-10 PROCEDURE — 99999 PR PBB SHADOW E&M-EST. PATIENT-LVL III: ICD-10-PCS | Mod: PBBFAC,,, | Performed by: INTERNAL MEDICINE

## 2019-04-10 PROCEDURE — 3078F PR MOST RECENT DIASTOLIC BLOOD PRESSURE < 80 MM HG: ICD-10-PCS | Mod: CPTII,S$GLB,, | Performed by: INTERNAL MEDICINE

## 2019-04-10 PROCEDURE — 3008F BODY MASS INDEX DOCD: CPT | Mod: CPTII,S$GLB,, | Performed by: INTERNAL MEDICINE

## 2019-04-10 PROCEDURE — 3008F PR BODY MASS INDEX (BMI) DOCUMENTED: ICD-10-PCS | Mod: CPTII,S$GLB,, | Performed by: INTERNAL MEDICINE

## 2019-04-10 PROCEDURE — 3078F DIAST BP <80 MM HG: CPT | Mod: CPTII,S$GLB,, | Performed by: INTERNAL MEDICINE

## 2019-04-10 PROCEDURE — 3075F SYST BP GE 130 - 139MM HG: CPT | Mod: CPTII,S$GLB,, | Performed by: INTERNAL MEDICINE

## 2019-04-10 NOTE — PROGRESS NOTES
Subjective:    Patient ID:  Linda He is a 54 y.o. female who presents for follow-up of Hypertension (1 month follow up)      Pt seen last month pre-op for knee procedure. We increased her amlodipine for better BP control. She has done well with the surgery and bp better controlled.      Review of Systems   Constitution: Negative for weight gain and weight loss.   HENT: Negative.    Eyes: Negative.    Cardiovascular: Negative for chest pain, claudication, cyanosis, dyspnea on exertion, irregular heartbeat, leg swelling, near-syncope, orthopnea (no PND) and palpitations.   Respiratory: Negative for cough, hemoptysis, shortness of breath and snoring.    Endocrine: Negative.    Skin: Negative.    Musculoskeletal: Negative for joint pain, muscle cramps, muscle weakness and myalgias.   Gastrointestinal: Negative for diarrhea, hematemesis, nausea and vomiting.   Genitourinary: Negative.    Neurological: Negative for dizziness, focal weakness, light-headedness, loss of balance, numbness, paresthesias and seizures.   Psychiatric/Behavioral: Negative.         Objective:    Physical Exam   Constitutional: She is oriented to person, place, and time. She appears well-developed and well-nourished.   Eyes: Pupils are equal, round, and reactive to light.   Neck: Normal range of motion. No thyromegaly present.   Cardiovascular: Normal rate, regular rhythm, S1 normal, S2 normal, normal heart sounds, intact distal pulses and normal pulses.  No extrasystoles are present. PMI is not displaced. Exam reveals no friction rub.   No murmur heard.  Pulmonary/Chest: Effort normal and breath sounds normal. She has no wheezes. She has no rales. She exhibits no tenderness.   Abdominal: Soft. Bowel sounds are normal. She exhibits no distension and no mass. There is no tenderness.   Musculoskeletal: Normal range of motion. She exhibits no edema.   Neurological: She is alert and oriented to person, place, and time.   Skin: Skin is warm  and dry.   Vitals reviewed.      Test(s) Reviewed  I have reviewed the following in detail:  [] Stress test   [] Angiography   [x] Echocardiogram   [] Labs   [] Other:         Assessment:       1. Essential hypertension         Plan:       BP improved with increased amlodipine  Will continue present Rx  F/u with PCP  Can f/u with us as needed

## 2019-05-28 ENCOUNTER — HOSPITAL ENCOUNTER (OUTPATIENT)
Dept: RADIOLOGY | Facility: HOSPITAL | Age: 54
Discharge: HOME | End: 2019-05-28
Attending: SPECIALIST
Payer: COMMERCIAL

## 2019-05-28 ENCOUNTER — OFFICE VISIT (OUTPATIENT)
Dept: OBSTETRICS AND GYNECOLOGY | Facility: CLINIC | Age: 54
End: 2019-05-28
Payer: COMMERCIAL

## 2019-05-28 VITALS
HEIGHT: 66 IN | WEIGHT: 158.06 LBS | WEIGHT: 158 LBS | BODY MASS INDEX: 25.39 KG/M2 | SYSTOLIC BLOOD PRESSURE: 142 MMHG | HEIGHT: 66 IN | DIASTOLIC BLOOD PRESSURE: 70 MMHG | BODY MASS INDEX: 25.4 KG/M2

## 2019-05-28 DIAGNOSIS — Z12.31 VISIT FOR SCREENING MAMMOGRAM: Primary | ICD-10-CM

## 2019-05-28 DIAGNOSIS — Z12.31 VISIT FOR SCREENING MAMMOGRAM: ICD-10-CM

## 2019-05-28 DIAGNOSIS — N95.2 ATROPHIC VAGINITIS: ICD-10-CM

## 2019-05-28 PROCEDURE — 77063 BREAST TOMOSYNTHESIS BI: CPT | Mod: 26,,, | Performed by: RADIOLOGY

## 2019-05-28 PROCEDURE — 77067 SCR MAMMO BI INCL CAD: CPT | Mod: TC,PN

## 2019-05-28 PROCEDURE — 3078F PR MOST RECENT DIASTOLIC BLOOD PRESSURE < 80 MM HG: ICD-10-PCS | Mod: CPTII,S$GLB,, | Performed by: SPECIALIST

## 2019-05-28 PROCEDURE — 99213 PR OFFICE/OUTPT VISIT, EST, LEVL III, 20-29 MIN: ICD-10-PCS | Mod: S$GLB,,, | Performed by: SPECIALIST

## 2019-05-28 PROCEDURE — 99999 PR PBB SHADOW E&M-EST. PATIENT-LVL III: CPT | Mod: PBBFAC,,, | Performed by: SPECIALIST

## 2019-05-28 PROCEDURE — 77067 MAMMO DIGITAL SCREENING BILAT WITH TOMOSYNTHESIS_CAD: ICD-10-PCS | Mod: 26,,, | Performed by: RADIOLOGY

## 2019-05-28 PROCEDURE — 77063 MAMMO DIGITAL SCREENING BILAT WITH TOMOSYNTHESIS_CAD: ICD-10-PCS | Mod: 26,,, | Performed by: RADIOLOGY

## 2019-05-28 PROCEDURE — 99999 PR PBB SHADOW E&M-EST. PATIENT-LVL III: ICD-10-PCS | Mod: PBBFAC,,, | Performed by: SPECIALIST

## 2019-05-28 PROCEDURE — 77067 SCR MAMMO BI INCL CAD: CPT | Mod: 26,,, | Performed by: RADIOLOGY

## 2019-05-28 PROCEDURE — 3078F DIAST BP <80 MM HG: CPT | Mod: CPTII,S$GLB,, | Performed by: SPECIALIST

## 2019-05-28 PROCEDURE — 3077F PR MOST RECENT SYSTOLIC BLOOD PRESSURE >= 140 MM HG: ICD-10-PCS | Mod: CPTII,S$GLB,, | Performed by: SPECIALIST

## 2019-05-28 PROCEDURE — 3008F PR BODY MASS INDEX (BMI) DOCUMENTED: ICD-10-PCS | Mod: CPTII,S$GLB,, | Performed by: SPECIALIST

## 2019-05-28 PROCEDURE — 99213 OFFICE O/P EST LOW 20 MIN: CPT | Mod: S$GLB,,, | Performed by: SPECIALIST

## 2019-05-28 PROCEDURE — 3008F BODY MASS INDEX DOCD: CPT | Mod: CPTII,S$GLB,, | Performed by: SPECIALIST

## 2019-05-28 PROCEDURE — 3077F SYST BP >= 140 MM HG: CPT | Mod: CPTII,S$GLB,, | Performed by: SPECIALIST

## 2019-05-28 RX ORDER — EFLORNITHINE HYDROCHLORIDE 139 MG/G
CREAM TOPICAL
Refills: 3 | COMMUNITY
Start: 2019-04-04 | End: 2023-07-27 | Stop reason: ALTCHOICE

## 2019-05-28 RX ORDER — ESTRADIOL 0.1 MG/G
1 CREAM VAGINAL DAILY
Qty: 30 G | Refills: 11 | Status: SHIPPED | OUTPATIENT
Start: 2019-05-28 | End: 2023-07-27 | Stop reason: ALTCHOICE

## 2019-05-28 NOTE — PROGRESS NOTES
55 yo WF presents for annual gyn evaluation and continued ERT management. Pt on systemic therapy as well as vaginal replacement for atrophy. Denies dysuria, vaginal itching, burning, d/c. mammo screening to be completed today as well.  Past Medical History:   Diagnosis Date    Abnormal Pap smear     Abnormal Pap smear of cervix     Allergy     Hormone disorder     Hypertension     Kidney stone     all passed spontaneously (approx x 2)    Urinary tract infection     Vaginal infection        Past Surgical History:   Procedure Laterality Date    APPENDECTOMY       surgery       SECTION      COLONOSCOPY N/A 2014    Performed by Jamaal Duggan Jr., MD at [facility] ENDO    CYSTOSCOPY      HYSTERECTOMY      OOPHORECTOMY         Family History   Problem Relation Age of Onset    Hypertension Mother     Hypertension Father     Nephrolithiasis Brother     Breast cancer Paternal Grandmother          of    Diabetes Neg Hx     Ovarian cancer Neg Hx        Social History     Socioeconomic History    Marital status:      Spouse name: Not on file    Number of children: Not on file    Years of education: Not on file    Highest education level: Not on file   Occupational History    Occupation: draws house plans, 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   Social Needs    Financial resource strain: Not on file    Food insecurity:     Worry: Not on file     Inability: Not on file    Transportation needs:     Medical: Not on file     Non-medical: Not on file   Tobacco Use    Smoking status: Never Smoker    Smokeless tobacco: Never Used   Substance and Sexual Activity    Alcohol use: Yes     Comment: occasionally    Drug use: No    Sexual activity: Yes     Birth control/protection: See Surgical Hx   Lifestyle    Physical activity:     Days per week: Not on file     Minutes per session: Not on file    Stress: Not on file   Relationships    Social connections:     Talks on phone: Not on file     Gets  together: Not on file     Attends Judaism service: Not on file     Active member of club or organization: Not on file     Attends meetings of clubs or organizations: Not on file     Relationship status: Not on file   Other Topics Concern    Not on file   Social History Narrative    Not on file       Current Outpatient Medications   Medication Sig Dispense Refill    amLODIPine (NORVASC) 10 MG tablet Take 1 tablet (10 mg total) by mouth once daily. 30 tablet 11    magnesium citrate 100 mg Tab Take by mouth once daily.      TURMERIC ORAL Take by mouth.      valacyclovir (VALTREX) 1000 MG tablet TAKE 2 PILLS AT THE FIRST SIGN OF A COLD SORE, TAKE AN ADDITIONAL 2 PILLS 12 HOURS LATER. 30 tablet 3    estradiol (ESTRACE) 0.01 % (0.1 mg/gram) vaginal cream Place 1 g vaginally once daily. 30 g 11    fish oil-omega-3 fatty acids 300-1,000 mg capsule Take 2 g by mouth once daily.      spironolactone (ALDACTONE) 50 MG tablet Take 50 mg by mouth once daily.      VANIQA 13.9 % cream APPLY TWICE A DAY TO UNWANTED FACIAL HAIR  3     No current facility-administered medications for this visit.        Review of patient's allergies indicates:   Allergen Reactions    Latex Hives    Macrobid [nitrofurantoin monohyd/m-cryst] Itching       Review of System:   General: no chills, fever, night sweats, weight gain or weight loss  Psychological: no depression or suicidal ideation  Breasts: no new or changing breast lumps, nipple discharge or masses.  Respiratory: no cough, shortness of breath, or wheezing  Cardiovascular: no chest pain or dyspnea on exertion  Gastrointestinal: no abdominal pain, change in bowel habits, or black or bloody stools  Genito-Urinary: no incontinence, urinary frequency/urgency or , pelvic pain or abnormal vaginal bleeding.  Musculoskeletal: no gait disturbance or muscular weakness    PE:   APPEARANCE: Well nourished, well developed, in no acute distress.  NECK: Neck symmetric without masses or  thyromegaly.  NODES: No inguinal lymph node enlargement.  ABDOMEN: Soft. No tenderness or masses. No hepatosplenomegaly. No hernias.  BREASTS: Symmetrical, no skin changes or visible lesions. No palpable masses, nipple discharge or adenopathy bilaterally.  PELVIC: Normal external female genitalia without lesions. Normal hair distribution. Adequate perineal body, normal urethral meatus. Vagina moist and well rugated without lesions or discharge.INTROITAL MUCOSAL THINNING AND ATROPHY NOTED No significant cystocele or rectocele. Uterus and cervix surgically absent. Bimanual exam revealed no masses, tenderness or abnormality.    Plan I rec continuing daily vaginal ERT   Mammo screening today and monthly BSE  RTO 1 year/prn  At the end of patient visit, nurse and MD both asked pt if any improvements needed in visit experience and asked whether any further pt questions or concerns.

## 2020-06-29 ENCOUNTER — TELEPHONE (OUTPATIENT)
Dept: OBSTETRICS AND GYNECOLOGY | Facility: CLINIC | Age: 55
End: 2020-06-29

## 2020-06-29 DIAGNOSIS — Z12.31 VISIT FOR SCREENING MAMMOGRAM: Primary | ICD-10-CM

## 2020-06-29 NOTE — TELEPHONE ENCOUNTER
----- Message from Vandana Jesus sent at 6/29/2020 10:09 AM CDT -----  Regarding: order for  mammogram  Contact: self  Type: Needs Medical Advice  Who Called:  self  Symptoms (please be specific):    How long has patient had these symptoms:  Pharmacy name and phone #:    Best Call Back Number: [phone] Additional Information: Patient called asking for an update for an order for mammogram. Patient want to get mammogram at the same location and same day with well woman visit.

## 2020-06-30 ENCOUNTER — HOSPITAL ENCOUNTER (OUTPATIENT)
Dept: RADIOLOGY | Facility: HOSPITAL | Age: 55
Discharge: HOME | End: 2020-06-30
Attending: SPECIALIST
Payer: COMMERCIAL

## 2020-06-30 ENCOUNTER — OFFICE VISIT (OUTPATIENT)
Dept: OBSTETRICS AND GYNECOLOGY | Facility: CLINIC | Age: 55
End: 2020-06-30
Payer: COMMERCIAL

## 2020-06-30 VITALS
SYSTOLIC BLOOD PRESSURE: 110 MMHG | WEIGHT: 143.5 LBS | BODY MASS INDEX: 23.16 KG/M2 | RESPIRATION RATE: 20 BRPM | DIASTOLIC BLOOD PRESSURE: 70 MMHG

## 2020-06-30 DIAGNOSIS — Z01.419 ENCOUNTER FOR GYNECOLOGICAL EXAMINATION WITHOUT ABNORMAL FINDING: ICD-10-CM

## 2020-06-30 DIAGNOSIS — Z01.419 PAP SMEAR, AS PART OF ROUTINE GYNECOLOGICAL EXAMINATION: Primary | ICD-10-CM

## 2020-06-30 DIAGNOSIS — Z12.31 VISIT FOR SCREENING MAMMOGRAM: ICD-10-CM

## 2020-06-30 PROCEDURE — 3078F PR MOST RECENT DIASTOLIC BLOOD PRESSURE < 80 MM HG: ICD-10-PCS | Mod: CPTII,S$GLB,, | Performed by: SPECIALIST

## 2020-06-30 PROCEDURE — 99396 PR PREVENTIVE VISIT,EST,40-64: ICD-10-PCS | Mod: S$GLB,,, | Performed by: SPECIALIST

## 2020-06-30 PROCEDURE — 3008F PR BODY MASS INDEX (BMI) DOCUMENTED: ICD-10-PCS | Mod: CPTII,S$GLB,, | Performed by: SPECIALIST

## 2020-06-30 PROCEDURE — 99999 PR PBB SHADOW E&M-EST. PATIENT-LVL III: ICD-10-PCS | Mod: PBBFAC,,, | Performed by: SPECIALIST

## 2020-06-30 PROCEDURE — 99396 PREV VISIT EST AGE 40-64: CPT | Mod: S$GLB,,, | Performed by: SPECIALIST

## 2020-06-30 PROCEDURE — 77063 MAMMO DIGITAL SCREENING BILAT WITH TOMOSYNTHESIS_CAD: ICD-10-PCS | Mod: 26,,, | Performed by: RADIOLOGY

## 2020-06-30 PROCEDURE — 77063 BREAST TOMOSYNTHESIS BI: CPT | Mod: 26,,, | Performed by: RADIOLOGY

## 2020-06-30 PROCEDURE — 77067 SCR MAMMO BI INCL CAD: CPT | Mod: TC,PN

## 2020-06-30 PROCEDURE — 77067 SCR MAMMO BI INCL CAD: CPT | Mod: 26,,, | Performed by: RADIOLOGY

## 2020-06-30 PROCEDURE — 77067 MAMMO DIGITAL SCREENING BILAT WITH TOMOSYNTHESIS_CAD: ICD-10-PCS | Mod: 26,,, | Performed by: RADIOLOGY

## 2020-06-30 PROCEDURE — 3074F PR MOST RECENT SYSTOLIC BLOOD PRESSURE < 130 MM HG: ICD-10-PCS | Mod: CPTII,S$GLB,, | Performed by: SPECIALIST

## 2020-06-30 PROCEDURE — 88175 CYTOPATH C/V AUTO FLUID REDO: CPT

## 2020-06-30 PROCEDURE — 3074F SYST BP LT 130 MM HG: CPT | Mod: CPTII,S$GLB,, | Performed by: SPECIALIST

## 2020-06-30 PROCEDURE — 99999 PR PBB SHADOW E&M-EST. PATIENT-LVL III: CPT | Mod: PBBFAC,,, | Performed by: SPECIALIST

## 2020-06-30 PROCEDURE — 3008F BODY MASS INDEX DOCD: CPT | Mod: CPTII,S$GLB,, | Performed by: SPECIALIST

## 2020-06-30 PROCEDURE — 3078F DIAST BP <80 MM HG: CPT | Mod: CPTII,S$GLB,, | Performed by: SPECIALIST

## 2020-06-30 NOTE — PROGRESS NOTES
54 yo WF presents for annual gyn evaluation and mammo screening. No overt gyn complaints.    Past Medical History:   Diagnosis Date    Abnormal Pap smear     Abnormal Pap smear of cervix     Allergy     Hormone disorder     Hypertension     Kidney stone     all passed spontaneously (approx x 2)    Urinary tract infection     Vaginal infection        Past Surgical History:   Procedure Laterality Date    APPENDECTOMY       surgery       SECTION      CYSTOSCOPY      HYSTERECTOMY      OOPHORECTOMY         Family History   Problem Relation Age of Onset    Hypertension Mother     Hypertension Father     Nephrolithiasis Brother     Breast cancer Paternal Grandmother 60         of    Diabetes Neg Hx     Ovarian cancer Neg Hx        Social History     Socioeconomic History    Marital status:      Spouse name: Not on file    Number of children: Not on file    Years of education: Not on file    Highest education level: Not on file   Occupational History    Occupation: draws house plans, 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   Social Needs    Financial resource strain: Not on file    Food insecurity     Worry: Not on file     Inability: Not on file    Transportation needs     Medical: Not on file     Non-medical: Not on file   Tobacco Use    Smoking status: Never Smoker    Smokeless tobacco: Never Used   Substance and Sexual Activity    Alcohol use: Yes     Comment: occasionally    Drug use: No    Sexual activity: Yes     Birth control/protection: See Surgical Hx   Lifestyle    Physical activity     Days per week: Not on file     Minutes per session: Not on file    Stress: Not on file   Relationships    Social connections     Talks on phone: Not on file     Gets together: Not on file     Attends Episcopal service: Not on file     Active member of club or organization: Not on file     Attends meetings of clubs or organizations: Not on file     Relationship status: Not on file   Other  Topics Concern    Not on file   Social History Narrative    Not on file       Current Outpatient Medications   Medication Sig Dispense Refill    estradiol (ESTRACE) 0.01 % (0.1 mg/gram) vaginal cream Place 1 g vaginally once daily. 30 g 11    fish oil-omega-3 fatty acids 300-1,000 mg capsule Take 2 g by mouth once daily.      magnesium citrate 100 mg Tab Take by mouth once daily.      spironolactone (ALDACTONE) 50 MG tablet Take 50 mg by mouth once daily.      TURMERIC ORAL Take by mouth.      valacyclovir (VALTREX) 1000 MG tablet TAKE 2 PILLS AT THE FIRST SIGN OF A COLD SORE, TAKE AN ADDITIONAL 2 PILLS 12 HOURS LATER. 30 tablet 3    VANIQA 13.9 % cream APPLY TWICE A DAY TO UNWANTED FACIAL HAIR  3    amLODIPine (NORVASC) 10 MG tablet Take 1 tablet (10 mg total) by mouth once daily. 30 tablet 11     No current facility-administered medications for this visit.        Review of patient's allergies indicates:   Allergen Reactions    Latex Hives    Macrobid [nitrofurantoin monohyd/m-cryst] Itching       Review of System:   General: no chills, fever, night sweats, weight gain or weight loss  Psychological: no depression or suicidal ideation  Breasts: no new or changing breast lumps, nipple discharge or masses.  Respiratory: no cough, shortness of breath, or wheezing  Cardiovascular: no chest pain or dyspnea on exertion  Gastrointestinal: no abdominal pain, change in bowel habits, or black or bloody stools  Genito-Urinary: no incontinence, urinary frequency/urgency or vulvar/vaginal symptoms, pelvic pain or abnormal vaginal bleeding.  Musculoskeletal: no gait disturbance or muscular weakness    PE:   APPEARANCE: Well nourished, well developed, in no acute distress.  NECK: Neck symmetric without masses or thyromegaly.  NODES: No inguinal lymph node enlargement.  ABDOMEN: Soft. No tenderness or masses. No hepatosplenomegaly. No hernias.  BREASTS: Symmetrical, no skin changes or visible lesions. No palpable  masses, nipple discharge or adenopathy bilaterally.  PELVIC: Normal external female genitalia without lesions. Normal hair distribution. Adequate perineal body, normal urethral meatus. Vagina moist and well rugated without lesions or discharge. No significant cystocele or rectocele. Uterus and cervix surgically absent. Bimanual exam revealed no masses, tenderness or abnormality.      Discussed BSE monthly  mammo screening to be completed    COUNSELING:  The patient was counseled today on osteoporosis prevention, calcium supplementation, and regular weight bearing exercise. The patient was also counseled today on ACS PAP guidelines, with recommendations for screening PAP smear age 21-65 every 3-5 yearsunless their uterus, cervix, and ovaries were removed for benign reasons. Screening with HPV testing ages 30-65 every 5 years as an alternative. The patient was also counseled regarding monthly breast self-examination, routine STD screening for at-risk populations, prophylactic immunizations for transmitted infections such as HPV, Pertussis, or Influenza as appropriate, and yearly mammograms when indicated by ACS guidelines. She was advised to see her primary care physician for all other health maintenance.   FOLLOW-UP with me for next routine visit.

## 2020-07-07 LAB
FINAL PATHOLOGIC DIAGNOSIS: NORMAL
Lab: NORMAL

## 2020-07-10 ENCOUNTER — HOSPITAL ENCOUNTER (OUTPATIENT)
Dept: RADIOLOGY | Facility: HOSPITAL | Age: 55
Discharge: HOME | End: 2020-07-10
Attending: SPECIALIST
Payer: COMMERCIAL

## 2020-07-10 DIAGNOSIS — R92.8 ABNORMAL MAMMOGRAM: ICD-10-CM

## 2020-07-10 PROCEDURE — 77061 BREAST TOMOSYNTHESIS UNI: CPT | Mod: TC,PO,LT

## 2020-07-10 PROCEDURE — 76642 ULTRASOUND BREAST LIMITED: CPT | Mod: TC,PO,LT

## 2020-07-10 PROCEDURE — 77061 BREAST TOMOSYNTHESIS UNI: CPT | Mod: 26,LT,, | Performed by: RADIOLOGY

## 2020-07-10 PROCEDURE — 76642 ULTRASOUND BREAST LIMITED: CPT | Mod: 26,LT,, | Performed by: RADIOLOGY

## 2020-07-10 PROCEDURE — 77065 DX MAMMO INCL CAD UNI: CPT | Mod: 26,LT,, | Performed by: RADIOLOGY

## 2020-07-10 PROCEDURE — 76642 US BREAST LEFT LIMITED: ICD-10-PCS | Mod: 26,LT,, | Performed by: RADIOLOGY

## 2020-07-10 PROCEDURE — 77061 MAMMO DIGITAL DIAGNOSTIC LEFT WITH TOMOSYNTHESIS_CAD: ICD-10-PCS | Mod: 26,LT,, | Performed by: RADIOLOGY

## 2020-07-10 PROCEDURE — 77065 DX MAMMO INCL CAD UNI: CPT | Mod: TC,PO,LT

## 2020-07-10 PROCEDURE — 77065 MAMMO DIGITAL DIAGNOSTIC LEFT WITH TOMOSYNTHESIS_CAD: ICD-10-PCS | Mod: 26,LT,, | Performed by: RADIOLOGY

## 2021-05-06 ENCOUNTER — PATIENT MESSAGE (OUTPATIENT)
Dept: RESEARCH | Facility: HOSPITAL | Age: 56
End: 2021-05-06

## 2021-05-10 ENCOUNTER — PATIENT MESSAGE (OUTPATIENT)
Dept: OBSTETRICS AND GYNECOLOGY | Facility: CLINIC | Age: 56
End: 2021-05-10

## 2021-06-28 ENCOUNTER — PATIENT MESSAGE (OUTPATIENT)
Dept: OBSTETRICS AND GYNECOLOGY | Facility: CLINIC | Age: 56
End: 2021-06-28

## 2021-06-28 DIAGNOSIS — Z12.31 VISIT FOR SCREENING MAMMOGRAM: Primary | ICD-10-CM

## 2021-07-19 ENCOUNTER — HOSPITAL ENCOUNTER (OUTPATIENT)
Dept: RADIOLOGY | Facility: HOSPITAL | Age: 56
Discharge: HOME | End: 2021-07-19
Attending: SPECIALIST
Payer: COMMERCIAL

## 2021-07-19 ENCOUNTER — OFFICE VISIT (OUTPATIENT)
Dept: OBSTETRICS AND GYNECOLOGY | Facility: CLINIC | Age: 56
End: 2021-07-19
Payer: COMMERCIAL

## 2021-07-19 VITALS
RESPIRATION RATE: 14 BRPM | HEIGHT: 66 IN | BODY MASS INDEX: 24.66 KG/M2 | WEIGHT: 153.44 LBS | SYSTOLIC BLOOD PRESSURE: 140 MMHG | DIASTOLIC BLOOD PRESSURE: 68 MMHG

## 2021-07-19 DIAGNOSIS — Z01.419 ENCOUNTER FOR GYNECOLOGICAL EXAMINATION WITHOUT ABNORMAL FINDING: Primary | ICD-10-CM

## 2021-07-19 DIAGNOSIS — Z12.31 VISIT FOR SCREENING MAMMOGRAM: ICD-10-CM

## 2021-07-19 PROCEDURE — 77063 MAMMO DIGITAL SCREENING BILAT WITH TOMO: ICD-10-PCS | Mod: 26,,, | Performed by: RADIOLOGY

## 2021-07-19 PROCEDURE — 1126F PR PAIN SEVERITY QUANTIFIED, NO PAIN PRESENT: ICD-10-PCS | Mod: CPTII,S$GLB,, | Performed by: SPECIALIST

## 2021-07-19 PROCEDURE — 1126F AMNT PAIN NOTED NONE PRSNT: CPT | Mod: CPTII,S$GLB,, | Performed by: SPECIALIST

## 2021-07-19 PROCEDURE — 77067 MAMMO DIGITAL SCREENING BILAT WITH TOMO: ICD-10-PCS | Mod: 26,,, | Performed by: RADIOLOGY

## 2021-07-19 PROCEDURE — 77067 SCR MAMMO BI INCL CAD: CPT | Mod: 26,,, | Performed by: RADIOLOGY

## 2021-07-19 PROCEDURE — 99396 PR PREVENTIVE VISIT,EST,40-64: ICD-10-PCS | Mod: S$GLB,,, | Performed by: SPECIALIST

## 2021-07-19 PROCEDURE — 77063 BREAST TOMOSYNTHESIS BI: CPT | Mod: 26,,, | Performed by: RADIOLOGY

## 2021-07-19 PROCEDURE — 99999 PR PBB SHADOW E&M-EST. PATIENT-LVL IV: CPT | Mod: PBBFAC,,, | Performed by: SPECIALIST

## 2021-07-19 PROCEDURE — 99396 PREV VISIT EST AGE 40-64: CPT | Mod: S$GLB,,, | Performed by: SPECIALIST

## 2021-07-19 PROCEDURE — 3008F PR BODY MASS INDEX (BMI) DOCUMENTED: ICD-10-PCS | Mod: CPTII,S$GLB,, | Performed by: SPECIALIST

## 2021-07-19 PROCEDURE — 99999 PR PBB SHADOW E&M-EST. PATIENT-LVL IV: ICD-10-PCS | Mod: PBBFAC,,, | Performed by: SPECIALIST

## 2021-07-19 PROCEDURE — 77067 SCR MAMMO BI INCL CAD: CPT | Mod: TC,PN

## 2021-07-19 PROCEDURE — 3008F BODY MASS INDEX DOCD: CPT | Mod: CPTII,S$GLB,, | Performed by: SPECIALIST

## 2022-04-27 ENCOUNTER — OFFICE VISIT (OUTPATIENT)
Dept: OBSTETRICS AND GYNECOLOGY | Facility: CLINIC | Age: 57
End: 2022-04-27
Payer: COMMERCIAL

## 2022-04-27 VITALS — WEIGHT: 136.69 LBS | BODY MASS INDEX: 22.06 KG/M2

## 2022-04-27 DIAGNOSIS — L90.0 LICHEN SCLEROSUS ET ATROPHICUS: Primary | ICD-10-CM

## 2022-04-27 PROCEDURE — 1159F PR MEDICATION LIST DOCUMENTED IN MEDICAL RECORD: ICD-10-PCS | Mod: CPTII,S$GLB,, | Performed by: OBSTETRICS & GYNECOLOGY

## 2022-04-27 PROCEDURE — 1159F MED LIST DOCD IN RCRD: CPT | Mod: CPTII,S$GLB,, | Performed by: OBSTETRICS & GYNECOLOGY

## 2022-04-27 PROCEDURE — 99213 PR OFFICE/OUTPT VISIT, EST, LEVL III, 20-29 MIN: ICD-10-PCS | Mod: S$GLB,,, | Performed by: OBSTETRICS & GYNECOLOGY

## 2022-04-27 PROCEDURE — 99999 PR PBB SHADOW E&M-EST. PATIENT-LVL III: ICD-10-PCS | Mod: PBBFAC,,, | Performed by: OBSTETRICS & GYNECOLOGY

## 2022-04-27 PROCEDURE — 99213 OFFICE O/P EST LOW 20 MIN: CPT | Mod: S$GLB,,, | Performed by: OBSTETRICS & GYNECOLOGY

## 2022-04-27 PROCEDURE — 99999 PR PBB SHADOW E&M-EST. PATIENT-LVL III: CPT | Mod: PBBFAC,,, | Performed by: OBSTETRICS & GYNECOLOGY

## 2022-04-27 PROCEDURE — 3008F BODY MASS INDEX DOCD: CPT | Mod: CPTII,S$GLB,, | Performed by: OBSTETRICS & GYNECOLOGY

## 2022-04-27 PROCEDURE — 3008F PR BODY MASS INDEX (BMI) DOCUMENTED: ICD-10-PCS | Mod: CPTII,S$GLB,, | Performed by: OBSTETRICS & GYNECOLOGY

## 2022-04-27 RX ORDER — CLOBETASOL PROPIONATE 0.5 MG/G
CREAM TOPICAL 2 TIMES DAILY
Qty: 60 G | Refills: 1 | Status: SHIPPED | OUTPATIENT
Start: 2022-04-27 | End: 2022-04-27

## 2022-04-27 RX ORDER — CLOBETASOL PROPIONATE 0.5 MG/G
CREAM TOPICAL
Qty: 45 G | Refills: 3 | Status: SHIPPED | OUTPATIENT
Start: 2022-04-27

## 2022-04-27 NOTE — PROGRESS NOTES
OB History    Para Term  AB Living   5 3 3   2     SAB IAB Ectopic Multiple Live Births   2              # Outcome Date GA Lbr Aldo/2nd Weight Sex Delivery Anes PTL Lv   5 SAB            4 SAB            3 Term            2 Term            1 Term                Review of Systems  Review of Systems        Objective:    Physical Exam:               Genitourinary:                              Assessment:        1. Lichen sclerosus et atrophicus             Plan:      Topical clonetasol

## 2022-04-27 NOTE — PROGRESS NOTES
Chief Complaint   Patient presents with    vaginal irritation      Pt c/o Vaginal wall are thin        History of Present Illness   57 y.o.  female   patient presents today for vaginal irritaion and tearing x 1-2 months, burning pain after intercourse.     Past medical and surgical history reviewed.   I have reviewed the patient's medical history in detail and updated the computerized patient record.    Review of patient's allergies indicates:   Allergen Reactions    Latex Hives    Macrobid [nitrofurantoin monohyd/m-cryst] Itching         Review of Systems - Negative except HPI  GEN ROS: negative for - chills or fever  Breast ROS: negative for breast lumps  Genito-Urinary ROS: no dysuria, trouble voiding, or hematuria      Physical Examination:  Wt 62 kg (136 lb 11 oz)   BMI 22.06 kg/m²    Constitutional: She is oriented to person, place, and time. She appears well-developed and well-nourished. No distress.   HENT:   Head: Normocephalic and atraumatic.   Eyes: Conjunctivae and EOM are normal. No scleral icterus.   Neck: Normal range of motion. Neck supple. No tracheal deviation present.   Cardiovascular: Normal rate.    Pulmonary/Chest: Effort normal. No respiratory distress. She exhibits no tenderness.  Abdominal: Soft. She exhibits no distension and no mass. There is no tenderness. There is no rebound and no guarding.   Genitourinary:    External rectal exam shows no thrombosed external hemorrhoids.    Pelvic exam was performed with patient supine.   No labial fusion. Posterior fourchete with hypopigmentation symmetrical lesion on external skin c/w lichen changes.    There is no rash, lesion or injury on the right labia.   There is no rash, lesion or injury on the left labia.   No bleeding and no signs of injury around the vaginal introitus, urethra is without lesions and well supported.    No vaginal discharge found.    No significant Cystocele, Enterocele or rectocele, and cuff well  supported.   Neurological: She is alert and oriented to person, place, and time. Coordination normal.   Skin: Skin is warm and dry. She is not diaphoretic.   Psychiatric: She has a normal mood and affect.          Assessment:  1. Lichen sclerosus et atrophicus  clobetasoL (TEMOVATE) 0.05 % cream       Plan:  Clobetasol cream  Patient informed will be contacted with results within 2 weeks. Encouraged to please call back or email if she has not heard from us by then.

## 2022-07-22 ENCOUNTER — HOSPITAL ENCOUNTER (OUTPATIENT)
Dept: RADIOLOGY | Facility: HOSPITAL | Age: 57
Discharge: HOME | End: 2022-07-22
Attending: SPECIALIST
Payer: COMMERCIAL

## 2022-07-22 ENCOUNTER — OFFICE VISIT (OUTPATIENT)
Dept: OBSTETRICS AND GYNECOLOGY | Facility: CLINIC | Age: 57
End: 2022-07-22
Payer: COMMERCIAL

## 2022-07-22 VITALS
BODY MASS INDEX: 21.86 KG/M2 | HEIGHT: 66 IN | WEIGHT: 136 LBS | DIASTOLIC BLOOD PRESSURE: 82 MMHG | SYSTOLIC BLOOD PRESSURE: 134 MMHG

## 2022-07-22 DIAGNOSIS — Z01.419 GYNECOLOGIC EXAM NORMAL: Primary | ICD-10-CM

## 2022-07-22 DIAGNOSIS — Z12.31 VISIT FOR SCREENING MAMMOGRAM: Primary | ICD-10-CM

## 2022-07-22 DIAGNOSIS — Z12.31 VISIT FOR SCREENING MAMMOGRAM: ICD-10-CM

## 2022-07-22 PROCEDURE — 3075F PR MOST RECENT SYSTOLIC BLOOD PRESS GE 130-139MM HG: ICD-10-PCS | Mod: CPTII,S$GLB,, | Performed by: SPECIALIST

## 2022-07-22 PROCEDURE — 3008F BODY MASS INDEX DOCD: CPT | Mod: CPTII,S$GLB,, | Performed by: SPECIALIST

## 2022-07-22 PROCEDURE — 77067 SCR MAMMO BI INCL CAD: CPT | Mod: 26,,, | Performed by: RADIOLOGY

## 2022-07-22 PROCEDURE — 1159F MED LIST DOCD IN RCRD: CPT | Mod: CPTII,S$GLB,, | Performed by: SPECIALIST

## 2022-07-22 PROCEDURE — 77063 BREAST TOMOSYNTHESIS BI: CPT | Mod: TC,PN

## 2022-07-22 PROCEDURE — 3008F PR BODY MASS INDEX (BMI) DOCUMENTED: ICD-10-PCS | Mod: CPTII,S$GLB,, | Performed by: SPECIALIST

## 2022-07-22 PROCEDURE — 99396 PREV VISIT EST AGE 40-64: CPT | Mod: S$GLB,,, | Performed by: SPECIALIST

## 2022-07-22 PROCEDURE — 77063 MAMMO DIGITAL SCREENING BILAT WITH TOMO: ICD-10-PCS | Mod: 26,,, | Performed by: RADIOLOGY

## 2022-07-22 PROCEDURE — 3075F SYST BP GE 130 - 139MM HG: CPT | Mod: CPTII,S$GLB,, | Performed by: SPECIALIST

## 2022-07-22 PROCEDURE — 77067 MAMMO DIGITAL SCREENING BILAT WITH TOMO: ICD-10-PCS | Mod: 26,,, | Performed by: RADIOLOGY

## 2022-07-22 PROCEDURE — 99999 PR PBB SHADOW E&M-EST. PATIENT-LVL III: ICD-10-PCS | Mod: PBBFAC,,, | Performed by: SPECIALIST

## 2022-07-22 PROCEDURE — 3079F PR MOST RECENT DIASTOLIC BLOOD PRESSURE 80-89 MM HG: ICD-10-PCS | Mod: CPTII,S$GLB,, | Performed by: SPECIALIST

## 2022-07-22 PROCEDURE — 77063 BREAST TOMOSYNTHESIS BI: CPT | Mod: 26,,, | Performed by: RADIOLOGY

## 2022-07-22 PROCEDURE — 1159F PR MEDICATION LIST DOCUMENTED IN MEDICAL RECORD: ICD-10-PCS | Mod: CPTII,S$GLB,, | Performed by: SPECIALIST

## 2022-07-22 PROCEDURE — 99999 PR PBB SHADOW E&M-EST. PATIENT-LVL III: CPT | Mod: PBBFAC,,, | Performed by: SPECIALIST

## 2022-07-22 PROCEDURE — 3079F DIAST BP 80-89 MM HG: CPT | Mod: CPTII,S$GLB,, | Performed by: SPECIALIST

## 2022-07-22 PROCEDURE — 99396 PR PREVENTIVE VISIT,EST,40-64: ICD-10-PCS | Mod: S$GLB,,, | Performed by: SPECIALIST

## 2022-07-22 NOTE — PROGRESS NOTES
58 yo WF presents for annual gyn eval  Past Medical History:   Diagnosis Date    Abnormal Pap smear     Abnormal Pap smear of cervix     Allergy     Hormone disorder     Hypertension     Kidney stone     all passed spontaneously (approx x 2)    Urinary tract infection     Vaginal infection        Past Surgical History:   Procedure Laterality Date    APPENDECTOMY       surgery       SECTION      CYSTOSCOPY      HYSTERECTOMY      OOPHORECTOMY         Family History   Problem Relation Age of Onset    Hypertension Mother     Hypertension Father     Breast cancer Paternal Grandmother 60         of    Nephrolithiasis Brother     Diabetes Neg Hx     Ovarian cancer Neg Hx        Social History     Socioeconomic History    Marital status:    Occupational History    Occupation: draws house plans, 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      clobetasoL (TEMOVATE) 0.05 % cream Place topical on vaginal irritation BID x 2 weeks, then QD x 2 weeks, then QOD x 2 weeks. Repeat 6 week course as needed. 45 g 3    spironolactone (ALDACTONE) 50 MG tablet Take 50 mg by mouth every morning.       valacyclovir (VALTREX) 1000 MG tablet TAKE 2 PILLS AT THE FIRST SIGN OF A COLD SORE, TAKE AN ADDITIONAL 2 PILLS 12 HOURS LATER. 30 tablet 3    vitamin D (VITAMIN D3) 1000 units Tab Take 1,000 Units by mouth every morning.      zinc gluconate 50 mg tablet Take 50 mg by mouth every morning.      amLODIPine (NORVASC) 10 MG tablet Take 1 tablet (10 mg total) by mouth once daily. (Patient taking differently:  Take 10 mg by mouth every evening. ) 30 tablet 11    estradiol (ESTRACE) 0.01 % (0.1 mg/gram) vaginal cream Place 1 g vaginally once daily. 30 g 11    VANIQA 13.9 % cream APPLY TWICE A DAY TO UNWANTED FACIAL HAIR  3     No current facility-administered medications for this visit.       Review of patient's allergies indicates:   Allergen Reactions    Latex Hives    Macrobid [nitrofurantoin monohyd/m-cryst] Itching       Review of System:   General: no chills, fever, night sweats, weight gain or weight loss  Psychological: no depression or suicidal ideation  Breasts: no new or changing breast lumps, nipple discharge or masses.  Respiratory: no cough, shortness of breath, or wheezing  Cardiovascular: no chest pain or dyspnea on exertion  Gastrointestinal: no abdominal pain, change in bowel habits, or black or bloody stools  Genito-Urinary: no incontinence, urinary frequency/urgency or vulvar/vaginal symptoms, pelvic pain or abnormal vaginal bleeding.  Musculoskeletal: no gait disturbance or muscular weakness    PE:   APPEARANCE: Well nourished, well developed, in no acute distress.  NECK: Neck symmetric without masses or thyromegaly.  NODES: No inguinal lymph node enlargement.  ABDOMEN: Soft. No tenderness or masses. No hepatosplenomegaly. No hernias.  BREASTS: Symmetrical, no skin changes or visible lesions. No palpable masses, nipple discharge or adenopathy bilaterally.  PELVIC: Normal external female genitalia without lesions. Normal hair distribution. Adequate perineal body, normal urethral meatus. Vagina moist and well rugated without lesions or discharge. No significant cystocele or rectocele. Uterus and cervix surgically absent. Bimanual exam revealed no masses, tenderness or abnormality.    NOTE  NURSING PERSONAL PRESENT FOR ENTIRE PHYSICAL EXAM      Plan BSE monthly  Screening mammogram yearly  Daily calcium intake and weight beraing  RTO 1 year/prn

## 2023-07-27 ENCOUNTER — OFFICE VISIT (OUTPATIENT)
Dept: OBSTETRICS AND GYNECOLOGY | Facility: CLINIC | Age: 58
End: 2023-07-27
Payer: COMMERCIAL

## 2023-07-27 ENCOUNTER — HOSPITAL ENCOUNTER (OUTPATIENT)
Dept: RADIOLOGY | Facility: HOSPITAL | Age: 58
Discharge: HOME | End: 2023-07-27
Attending: SPECIALIST
Payer: COMMERCIAL

## 2023-07-27 ENCOUNTER — PATIENT MESSAGE (OUTPATIENT)
Dept: OBSTETRICS AND GYNECOLOGY | Facility: CLINIC | Age: 58
End: 2023-07-27

## 2023-07-27 VITALS
DIASTOLIC BLOOD PRESSURE: 72 MMHG | SYSTOLIC BLOOD PRESSURE: 118 MMHG | WEIGHT: 145.94 LBS | BODY MASS INDEX: 23.46 KG/M2 | HEIGHT: 66 IN

## 2023-07-27 DIAGNOSIS — Z78.0 MENOPAUSE: Primary | ICD-10-CM

## 2023-07-27 DIAGNOSIS — Z12.31 VISIT FOR SCREENING MAMMOGRAM: ICD-10-CM

## 2023-07-27 PROCEDURE — 99999 PR PBB SHADOW E&M-EST. PATIENT-LVL IV: CPT | Mod: PBBFAC,,, | Performed by: SPECIALIST

## 2023-07-27 PROCEDURE — 1159F PR MEDICATION LIST DOCUMENTED IN MEDICAL RECORD: ICD-10-PCS | Mod: CPTII,S$GLB,, | Performed by: SPECIALIST

## 2023-07-27 PROCEDURE — 3074F SYST BP LT 130 MM HG: CPT | Mod: CPTII,S$GLB,, | Performed by: SPECIALIST

## 2023-07-27 PROCEDURE — 77067 SCR MAMMO BI INCL CAD: CPT | Mod: TC,PN

## 2023-07-27 PROCEDURE — 77067 MAMMO DIGITAL SCREENING BILAT WITH TOMO: ICD-10-PCS | Mod: 26,,, | Performed by: RADIOLOGY

## 2023-07-27 PROCEDURE — 99999 PR PBB SHADOW E&M-EST. PATIENT-LVL IV: ICD-10-PCS | Mod: PBBFAC,,, | Performed by: SPECIALIST

## 2023-07-27 PROCEDURE — 3078F PR MOST RECENT DIASTOLIC BLOOD PRESSURE < 80 MM HG: ICD-10-PCS | Mod: CPTII,S$GLB,, | Performed by: SPECIALIST

## 2023-07-27 PROCEDURE — 99213 OFFICE O/P EST LOW 20 MIN: CPT | Mod: S$GLB,,, | Performed by: SPECIALIST

## 2023-07-27 PROCEDURE — 3008F PR BODY MASS INDEX (BMI) DOCUMENTED: ICD-10-PCS | Mod: CPTII,S$GLB,, | Performed by: SPECIALIST

## 2023-07-27 PROCEDURE — 1159F MED LIST DOCD IN RCRD: CPT | Mod: CPTII,S$GLB,, | Performed by: SPECIALIST

## 2023-07-27 PROCEDURE — 77063 MAMMO DIGITAL SCREENING BILAT WITH TOMO: ICD-10-PCS | Mod: 26,,, | Performed by: RADIOLOGY

## 2023-07-27 PROCEDURE — 77063 BREAST TOMOSYNTHESIS BI: CPT | Mod: 26,,, | Performed by: RADIOLOGY

## 2023-07-27 PROCEDURE — 3078F DIAST BP <80 MM HG: CPT | Mod: CPTII,S$GLB,, | Performed by: SPECIALIST

## 2023-07-27 PROCEDURE — 3074F PR MOST RECENT SYSTOLIC BLOOD PRESSURE < 130 MM HG: ICD-10-PCS | Mod: CPTII,S$GLB,, | Performed by: SPECIALIST

## 2023-07-27 PROCEDURE — 3008F BODY MASS INDEX DOCD: CPT | Mod: CPTII,S$GLB,, | Performed by: SPECIALIST

## 2023-07-27 PROCEDURE — 77067 SCR MAMMO BI INCL CAD: CPT | Mod: 26,,, | Performed by: RADIOLOGY

## 2023-07-27 PROCEDURE — 99213 PR OFFICE/OUTPT VISIT, EST, LEVL III, 20-29 MIN: ICD-10-PCS | Mod: S$GLB,,, | Performed by: SPECIALIST

## 2023-07-27 NOTE — PROGRESS NOTES
59 yo WF presents for annual gyn eval and screening mammogram  No overt gyn complaints today  Past Medical History:   Diagnosis Date    Abnormal Pap smear     Abnormal Pap smear of cervix     Allergy     Hormone disorder     Hypertension     Kidney stone     all passed spontaneously (approx x 2)    Urinary tract infection     Vaginal infection        Past Surgical History:   Procedure Laterality Date    APPENDECTOMY       surgery       SECTION      CYSTOSCOPY      HYSTERECTOMY      OOPHORECTOMY         Family History   Problem Relation Age of Onset    Hypertension Mother     Hypertension Father     Breast cancer Paternal Grandmother 60         of    Nephrolithiasis Brother     Diabetes Neg Hx     Ovarian cancer Neg Hx        Social History     Socioeconomic History    Marital status:    Occupational History    Occupation: draws house plans, [employer] works   Tobacco Use    Smoking status: Never    Smokeless tobacco: Never   Substance and Sexual Activity    Alcohol use: Yes     Comment: occasionally    Drug use: No    Sexual activity: Yes     Birth control/protection: See Surgical Hx       Current Outpatient Medications   Medication Sig Dispense Refill    ascorbic acid, vitamin C, (VITAMIN C) 1000 MG tablet Take 1,000 mg by mouth every morning.      clobetasoL (TEMOVATE) 0.05 % cream Place topical on vaginal irritation BID x 2 weeks, then QD x 2 weeks, then QOD x 2 weeks. Repeat 6 week course as needed. 45 g 3    spironolactone (ALDACTONE) 50 MG tablet Take 50 mg by mouth every morning.       vitamin D (VITAMIN D3) 1000 units Tab Take 1,000 Units by mouth every morning.      zinc gluconate 50 mg tablet Take 50 mg by mouth every morning.      valacyclovir (VALTREX) 1000 MG tablet TAKE 2 PILLS AT THE FIRST SIGN OF A COLD SORE, TAKE AN ADDITIONAL 2 PILLS 12 HOURS LATER. (Patient not taking: Reported on 2023) 30 tablet 3     No current facility-administered medications for this visit.        Review of patient's allergies indicates:   Allergen Reactions    Latex Hives    Macrobid [nitrofurantoin monohyd/m-cryst] Itching       Review of System:   General: no chills, fever, night sweats, weight gain or weight loss  Psychological: no depression or suicidal ideation  Breasts: no new or changing breast lumps, nipple discharge or masses.  Respiratory: no cough, shortness of breath, or wheezing  Cardiovascular: no chest pain or dyspnea on exertion  Gastrointestinal: no abdominal pain, change in bowel habits, or black or bloody stools  Genito-Urinary: no incontinence, urinary frequency/urgency or vulvar/vaginal symptoms, pelvic pain or abnormal vaginal bleeding.  Musculoskeletal: no gait disturbance or muscular weakness     PE:   APPEARANCE: Well nourished, well developed, in no acute distress.  NECK: Neck symmetric without masses or thyromegaly.  NODES: No inguinal lymph node enlargement.  ABDOMEN: Soft. No tenderness or masses. No hepatosplenomegaly. No hernias.  BREASTS: Symmetrical, no skin changes or visible lesions. No palpable masses, nipple discharge or adenopathy bilaterally.  PELVIC: Normal external female genitalia without lesions. Normal hair distribution. Adequate perineal body, normal urethral meatus. Vagina moist and well rugated without lesions or discharge. No significant cystocele or rectocele. Uterus and cervix surgically absent. Bimanual exam revealed no masses, tenderness or abnormality.    NOTE  NURSING PERSONAL PRESENT FOR ENTIRE PHYSICAL EXAM     Plan  Rec BSE monthly  Screening mammogram today and yearly  Daily calcium intake  Rec DERXA scan this year and will follow  RTO 1 year/prn    I spent a total of 30 minutes on the day of the visit. This includes face to face time and non-face to face time preparing to see the patient (eg, review of tests), obtaining and/or reviewing separately obtained history, documenting clinical information in the electronic or other health record,  independently interpreting results and communicating results to the patient/family/caregiver, or care coordinator.

## 2024-08-20 ENCOUNTER — TELEPHONE (OUTPATIENT)
Dept: OBSTETRICS AND GYNECOLOGY | Facility: CLINIC | Age: 59
End: 2024-08-20
Payer: COMMERCIAL

## 2024-08-20 DIAGNOSIS — Z12.31 VISIT FOR SCREENING MAMMOGRAM: Primary | ICD-10-CM

## 2024-08-20 NOTE — TELEPHONE ENCOUNTER
Pt called wanting to denae her annual and mammo on the same day  Both appts where made on 9/5/24  Pt verb understanding

## 2024-08-20 NOTE — TELEPHONE ENCOUNTER
----- Message from Nelia Cantu sent at 8/20/2024 11:47 AM CDT -----  Type:  Orders     Who Called:pt    Does the patient know what this is regarding?:Mammo Orders   Would the patient rather a call back or a response via [facility]? Call   Best Call Back Number:[phone]  Additional Information:     Pt would like to schedule her mammo and annual on the same day

## 2024-09-05 ENCOUNTER — OFFICE VISIT (OUTPATIENT)
Dept: OBSTETRICS AND GYNECOLOGY | Facility: CLINIC | Age: 59
End: 2024-09-05
Payer: COMMERCIAL

## 2024-09-05 ENCOUNTER — HOSPITAL ENCOUNTER (OUTPATIENT)
Dept: RADIOLOGY | Facility: HOSPITAL | Age: 59
Discharge: HOME | End: 2024-09-05
Attending: SPECIALIST
Payer: COMMERCIAL

## 2024-09-05 VITALS — SYSTOLIC BLOOD PRESSURE: 130 MMHG | DIASTOLIC BLOOD PRESSURE: 64 MMHG | WEIGHT: 147.5 LBS | BODY MASS INDEX: 23.81 KG/M2

## 2024-09-05 DIAGNOSIS — Z12.31 VISIT FOR SCREENING MAMMOGRAM: ICD-10-CM

## 2024-09-05 DIAGNOSIS — Z01.419 WELL WOMAN EXAM: Primary | ICD-10-CM

## 2024-09-05 PROCEDURE — 77063 BREAST TOMOSYNTHESIS BI: CPT | Mod: 26,,, | Performed by: RADIOLOGY

## 2024-09-05 PROCEDURE — 77067 SCR MAMMO BI INCL CAD: CPT | Mod: TC,PN

## 2024-09-05 PROCEDURE — 99999 PR PBB SHADOW E&M-EST. PATIENT-LVL III: CPT | Mod: PBBFAC,,, | Performed by: SPECIALIST

## 2024-09-05 PROCEDURE — 77067 SCR MAMMO BI INCL CAD: CPT | Mod: 26,,, | Performed by: RADIOLOGY

## 2024-09-05 NOTE — PROGRESS NOTES
58 yo WF history Hyst presents for annual gyne hilaria, breast exam and screening mammogram. Pt continue HRT via pellets outside [facility]  HRT consisting of E2./Test  Discussed subjective indications for HRT and potential risks  Past Medical History:   Diagnosis Date    Abnormal Pap smear     Abnormal Pap smear of cervix     Allergy     Hormone disorder     Hypertension     Kidney stone     all passed spontaneously (approx x 2)    Urinary tract infection     Vaginal infection        Past Surgical History:   Procedure Laterality Date    APPENDECTOMY       surgery       SECTION      CYSTOSCOPY      HYSTERECTOMY      OOPHORECTOMY         Family History   Problem Relation Name Age of Onset    Hypertension Mother      Hypertension Father      Breast cancer Paternal Grandmother  60         of    Nephrolithiasis Brother      Diabetes Neg Hx      Ovarian cancer Neg Hx         Social History     Socioeconomic History    Marital status:    Occupational History    Occupation: draws house plans, 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      clobetasoL (TEMOVATE) 0.05 % cream Place topical on vaginal irritation BID x 2 weeks, then QD x 2 weeks, then QOD x 2 weeks. Repeat 6 week course as needed. 45 g 3    spironolactone (ALDACTONE) 50 MG tablet Take 50 mg by mouth every morning.       vitamin D (VITAMIN D3) 1000 units Tab Take 1,000 Units by mouth every morning.      zinc gluconate 50 mg tablet Take 50 mg by mouth every morning.      valacyclovir (VALTREX) 1000 MG tablet TAKE 2 PILLS AT THE FIRST SIGN OF A COLD SORE, TAKE AN ADDITIONAL 2 PILLS 12  HOURS LATER. (Patient not taking: Reported on 7/27/2023) 30 tablet 3     No current facility-administered medications for this visit.       Review of patient's allergies indicates:   Allergen Reactions    Latex Hives    Macrobid [nitrofurantoin monohyd/m-cryst] Itching       Review of System:   General: no chills, fever, night sweats, weight gain or weight loss  Psychological: no depression or suicidal ideation  Breasts: no new or changing breast lumps, nipple discharge or masses.  Respiratory: no cough, shortness of breath, or wheezing  Cardiovascular: no chest pain or dyspnea on exertion  Gastrointestinal: no abdominal pain, change in bowel habits, or black or bloody stools  Genito-Urinary: no incontinence, urinary frequency/urgency or vulvar/vaginal symptoms, pelvic pain or abnormal vaginal bleeding.  Musculoskeletal: no gait disturbance or muscular weakness     PE:   APPEARANCE: Well nourished, well developed, in no acute distress.  NECK: Neck symmetric without masses or thyromegaly.  NODES: No inguinal lymph node enlargement.  ABDOMEN: Soft. No tenderness or masses. No hepatosplenomegaly. No hernias.  BREASTS: Symmetrical, no skin changes or visible lesions. No palpable masses, nipple discharge or adenopathy bilaterally.  PELVIC: Normal external female genitalia without lesions. Normal hair distribution. Adequate perineal body, normal urethral meatus. Vagina moist and well rugated without lesions or discharge. No significant cystocele or rectocele. Uterus and cervix surgically absent. Bimanual exam revealed no masses, tenderness or abnormality.    NOTE  NURSING PERSONAL PRESENT FOR ENTIRE PHYSICAL EXAM     Plan  BSE monthly  Screening mammogram today and yearly  Daily calcium intake and weight bearing exercise  RTO 2 years/prn    I spent a total of 30 minutes on the day of the visit. This includes face to face time and non-face to face time preparing to see the patient (eg, review of tests), obtaining and/or  reviewing separately obtained history, documenting clinical information in the electronic or other health record, independently interpreting results and communicating results to the patient/family/caregiver, or care coordinator.